# Patient Record
Sex: FEMALE | Race: WHITE | Employment: OTHER | URBAN - NONMETROPOLITAN AREA
[De-identification: names, ages, dates, MRNs, and addresses within clinical notes are randomized per-mention and may not be internally consistent; named-entity substitution may affect disease eponyms.]

---

## 2019-10-01 ENCOUNTER — OFFICE VISIT (OUTPATIENT)
Dept: OBGYN | Age: 59
End: 2019-10-01
Payer: COMMERCIAL

## 2019-10-01 VITALS
BODY MASS INDEX: 24.84 KG/M2 | HEART RATE: 66 BPM | SYSTOLIC BLOOD PRESSURE: 153 MMHG | TEMPERATURE: 98.2 F | WEIGHT: 135 LBS | HEIGHT: 62 IN | DIASTOLIC BLOOD PRESSURE: 77 MMHG

## 2019-10-01 DIAGNOSIS — Z01.419 WOMEN'S ANNUAL ROUTINE GYNECOLOGICAL EXAMINATION: Primary | ICD-10-CM

## 2019-10-01 DIAGNOSIS — Z87.42 HX OF ABNORMAL CERVICAL PAP SMEAR: ICD-10-CM

## 2019-10-01 DIAGNOSIS — Z78.0 POSTMENOPAUSAL: ICD-10-CM

## 2019-10-01 DIAGNOSIS — Z11.51 SCREENING FOR HPV (HUMAN PAPILLOMAVIRUS): ICD-10-CM

## 2019-10-01 DIAGNOSIS — Z12.72 SCREENING FOR VAGINAL CANCER: ICD-10-CM

## 2019-10-01 DIAGNOSIS — Z12.31 ENCOUNTER FOR SCREENING MAMMOGRAM FOR BREAST CANCER: ICD-10-CM

## 2019-10-01 PROCEDURE — 99386 PREV VISIT NEW AGE 40-64: CPT | Performed by: OBSTETRICS & GYNECOLOGY

## 2019-10-01 RX ORDER — ALMOTRIPTAN 12.5 MG/1
12.5 TABLET, FILM COATED ORAL
COMMUNITY

## 2019-10-01 RX ORDER — VALACYCLOVIR HYDROCHLORIDE 500 MG/1
500 TABLET, FILM COATED ORAL DAILY
Qty: 90 TABLET | Refills: 3 | Status: SHIPPED | OUTPATIENT
Start: 2019-10-01 | End: 2020-10-20 | Stop reason: SDUPTHER

## 2019-10-01 RX ORDER — VALACYCLOVIR HYDROCHLORIDE 500 MG/1
500 TABLET, FILM COATED ORAL DAILY
Qty: 30 TABLET | Refills: 11 | Status: SHIPPED | OUTPATIENT
Start: 2019-10-01 | End: 2019-10-01 | Stop reason: SDUPTHER

## 2019-10-01 SDOH — HEALTH STABILITY: MENTAL HEALTH: HOW OFTEN DO YOU HAVE A DRINK CONTAINING ALCOHOL?: NEVER

## 2019-10-01 ASSESSMENT — ENCOUNTER SYMPTOMS
GASTROINTESTINAL NEGATIVE: 1
EYES NEGATIVE: 1
RESPIRATORY NEGATIVE: 1

## 2019-10-04 LAB
HPV COMMENT: NORMAL
HPV TYPE 16: NOT DETECTED
HPV TYPE 18: NOT DETECTED
HPVOH (OTHER TYPES): NOT DETECTED

## 2019-10-15 ENCOUNTER — HOSPITAL ENCOUNTER (OUTPATIENT)
Dept: WOMENS IMAGING | Age: 59
Discharge: HOME | End: 2019-10-15
Payer: COMMERCIAL

## 2019-10-15 DIAGNOSIS — Z12.31 ENCOUNTER FOR SCREENING MAMMOGRAM FOR BREAST CANCER: ICD-10-CM

## 2019-10-15 DIAGNOSIS — Z78.0 POSTMENOPAUSAL: ICD-10-CM

## 2019-10-15 PROCEDURE — 77080 DXA BONE DENSITY AXIAL: CPT

## 2019-10-15 PROCEDURE — 77063 BREAST TOMOSYNTHESIS BI: CPT

## 2020-08-28 ENCOUNTER — OFFICE VISIT (OUTPATIENT)
Dept: OBGYN | Age: 60
End: 2020-08-28
Payer: COMMERCIAL

## 2020-08-28 VITALS
BODY MASS INDEX: 26.87 KG/M2 | WEIGHT: 146 LBS | DIASTOLIC BLOOD PRESSURE: 88 MMHG | HEIGHT: 62 IN | SYSTOLIC BLOOD PRESSURE: 150 MMHG

## 2020-08-28 PROCEDURE — 99214 OFFICE O/P EST MOD 30 MIN: CPT | Performed by: NURSE PRACTITIONER

## 2020-08-28 RX ORDER — SULFAMETHOXAZOLE AND TRIMETHOPRIM 800; 160 MG/1; MG/1
1 TABLET ORAL 2 TIMES DAILY
Qty: 14 TABLET | Refills: 0 | Status: SHIPPED | OUTPATIENT
Start: 2020-08-28 | End: 2020-08-31 | Stop reason: SINTOL

## 2020-08-28 RX ORDER — FLUCONAZOLE 150 MG/1
150 TABLET ORAL
Qty: 2 TABLET | Refills: 0 | Status: SHIPPED | OUTPATIENT
Start: 2020-08-28 | End: 2020-09-03

## 2020-08-28 ASSESSMENT — ENCOUNTER SYMPTOMS
CONSTIPATION: 0
RESPIRATORY NEGATIVE: 1
GASTROINTESTINAL NEGATIVE: 1
ALLERGIC/IMMUNOLOGIC NEGATIVE: 1
EYES NEGATIVE: 1
DIARRHEA: 0

## 2020-08-28 NOTE — PROGRESS NOTES
Suzette Heimlich is a 63 y.o. female who presents today for her medical conditions/ complaints as noted below. Suzette Heimlich is c/o of Cyst (labial )        HPI  Pt presents c/o recurrent \"cysts down there. \" Will become tender and then drain a yellow/bloody discharge. This will happen every few months. She had a lymph node removed on her right side 35 years ago- states it was a lymph node that kept getting infected. This time, she is having the nodules and draining on her left side. No LMP recorded. Patient has had a hysterectomy.       Past Medical History:   Diagnosis Date    Abnormal Pap smear of cervix     Carcinoma in situ     Chest pressure 10/29/2014    10/29/2014  SE  negative for myocardial ischemia [facility])      Depression     Genital herpes     Osteoarthritis      Past Surgical History:   Procedure Laterality Date    APPENDECTOMY      CYST REMOVAL      FROM LEGS    HAND SURGERY      trigger thumb    MAYTE AND BSO       Family History   Problem Relation Age of Onset    Cancer Maternal Grandmother     Diabetes Father     Eclampsia Father     Heart Failure Father     Eclampsia Mother     Cancer Sister      Social History     Tobacco Use    Smoking status: Current Every Day Smoker    Smokeless tobacco: Never Used   Substance Use Topics    Alcohol use: Not Currently     Frequency: Never       Current Outpatient Medications   Medication Sig Dispense Refill    sulfamethoxazole-trimethoprim (BACTRIM DS;SEPTRA DS) 800-160 MG per tablet Take 1 tablet by mouth 2 times daily for 7 days 14 tablet 0    fluconazole (DIFLUCAN) 150 MG tablet Take 1 tablet by mouth every 72 hours for 6 days 2 tablet 0    almotriptan (AXERT) 12.5 MG tablet Take 12.5 mg by mouth once as needed for Migraine may repeat in 2 hours if needed      choline fenofibrate (TRILIPIX) 45 MG CPDR delayed release capsule Take 45 mg by mouth daily      Cholecalciferol (VITAMIN D3) 5000 units TABS Take by mouth      valACYclovir (VALTREX) 500 MG tablet Take 1 tablet by mouth daily 90 tablet 3    estrogens, conjugated, (PREMARIN) 0.3 MG tablet Take 1 tablet by mouth daily Indications: 0.3mg 90 tablet 3    NEXIUM 40 MG capsule TAKE 1 CAPSULE DAILY 90 capsule 0    Escitalopram Oxalate (LEXAPRO PO) Take  by mouth.  Rosuvastatin Calcium (CRESTOR PO) Take  by mouth. No current facility-administered medications for this visit. No Known Allergies  Vitals:    08/28/20 1003   BP: (!) 150/88     Body mass index is 26.7 kg/m². Review of Systems   Constitutional: Negative. HENT: Negative. Eyes: Negative. Respiratory: Negative. Cardiovascular: Negative. Gastrointestinal: Negative. Negative for constipation and diarrhea. Endocrine: Negative. Genitourinary: Positive for genital sores (left groin). Negative for frequency, menstrual problem and urgency. Musculoskeletal: Negative. Skin: Negative. Allergic/Immunologic: Negative. Neurological: Negative. Hematological: Negative. Psychiatric/Behavioral: Negative. All other systems reviewed and are negative. Physical Exam  Vitals signs and nursing note reviewed. Constitutional:       Appearance: She is well-developed. HENT:      Head: Normocephalic. Right Ear: External ear normal.      Left Ear: External ear normal.      Nose: Nose normal.   Neck:      Musculoskeletal: Normal range of motion. Genitourinary:         Comments: 3 tiny pinpoint open areas  Expressed sero/sang discharge with palpation  Nontender today  Under the skin, nodules approx 0.5cm each  Musculoskeletal: Normal range of motion. Skin:     General: Skin is warm and dry. Neurological:      Mental Status: She is alert and oriented to person, place, and time.    Psychiatric:         Attention and Perception: Attention normal.         Mood and Affect: Mood normal.         Speech: Speech normal.         Behavior: Behavior normal.         Thought Content: Thought content normal.         Cognition and Memory: Cognition normal.         Judgment: Judgment normal.          Diagnosis Orders   1. Perineal abscess  Culture, Wound   2. Vulvar lesion         MEDICATIONS:  Orders Placed This Encounter   Medications    sulfamethoxazole-trimethoprim (BACTRIM DS;SEPTRA DS) 800-160 MG per tablet     Sig: Take 1 tablet by mouth 2 times daily for 7 days     Dispense:  14 tablet     Refill:  0    fluconazole (DIFLUCAN) 150 MG tablet     Sig: Take 1 tablet by mouth every 72 hours for 6 days     Dispense:  2 tablet     Refill:  0       ORDERS:  Orders Placed This Encounter   Procedures    Culture, Wound       PLAN:  Discussed possible staph infection to perineal lymph nodes? Wound culture obtained  Starting Bactrim and instructed on sitz baths  F/u for surgical consult if no improvement  Pt states understanding    Patient Instructions   Patient Education        Perineal Abscess: Care Instructions  Your Care Instructions  A perineal abscess is an infection that causes a painful lump in the perineum. The perineum is the area between the scrotum and the anus in a man. In a woman, it's the area between the vulva and the anus. The area may look red and feel painful and be swollen. The abscess may form after surgery or after delivery of a baby. It can also be caused by an infection of the prostate gland. The prostate gland is an organ found inside a man's body, just below the bladder. Your doctor may have done minor surgery to open and drain the abscess. You may have had a sedative to help you relax. You may be unsteady after having sedation. It can take a few hours for the medicine's effects to wear off. Common side effects include nausea, vomiting, and feeling sleepy or tired. The doctor has checked you carefully, but problems can develop later. If you notice any problems or new symptoms, get medical treatment right away.   Follow-up care is a key part of your treatment and safety. Be sure to make and go to all appointments, and call your doctor if you are having problems. It's also a good idea to know your test results and keep a list of the medicines you take. How can you care for yourself at home? · If your doctor gave you a sedative:  ? For 24 hours, don't do anything that requires attention to detail. This includes going to work, making important decisions, or signing any legal documents. It takes time for the medicine's effects to completely wear off.  ? For your safety, do not drive or operate any machinery that could be dangerous. Wait until the medicine wears off. You need to be able to think clearly and react easily. · Be safe with medicines. Read and follow all instructions on the label. ? If the doctor gave you a prescription medicine for pain, take it as prescribed. ? If you are not taking a prescription pain medicine, ask your doctor if you can take an over-the-counter medicine. · If your doctor prescribed antibiotics, take them as directed. Do not stop taking them just because you feel better. You need to take the full course of antibiotics. · Sit in a few inches of warm water (sitz bath) 3 times a day and after bowel movements. The warm water helps the area heal and eases discomfort. When should you call for help? [id number] anytime you think you may need emergency care. For example, call if:  · You have trouble breathing. · You passed out (lost consciousness). Call your doctor now or seek immediate medical care if:  · You have symptoms of infection, such as:  ? Increased pain, swelling, warmth, or redness. ? Red streaks leading from the area. ? Pus draining from the area. ? A fever. Watch closely for changes in your health, and be sure to contact your doctor if:  · You do not get better as expected. Where can you learn more? Go to [email or web address] org and sign in to your [employer] account.  Enter N196 in the [employer] box to learn more about \"Perineal Abscess: Care Instructions. \"     If you do not have an account, please click on the \"Sign Up Now\" link. Current as of: August 12, 2019               Content Version: 12.5  © 1392-4575 [employer]. Care instructions adapted under license by [facility] [place] ([facility]). If you have questions about a medical condition or this instruction, always ask your healthcare professional. [address] any warranty or liability for your use of this information.

## 2020-08-28 NOTE — PROGRESS NOTES
Pt states she has a left labial cyst. She states she showed this to Dr Karina Page last year and she thought is was due to her hormones but is has started swelling and draining.  She does want in taken out but might need to wait until the beginning of the year depending on cost.

## 2020-08-31 RX ORDER — AMOXICILLIN AND CLAVULANATE POTASSIUM 875; 125 MG/1; MG/1
1 TABLET, FILM COATED ORAL 2 TIMES DAILY
Qty: 14 TABLET | Refills: 0 | Status: SHIPPED | OUTPATIENT
Start: 2020-08-31 | End: 2020-09-07

## 2020-09-01 LAB
GRAM STAIN RESULT: ABNORMAL
ORGANISM: ABNORMAL
ORGANISM: ABNORMAL
WOUND/ABSCESS: ABNORMAL
WOUND/ABSCESS: ABNORMAL

## 2020-09-08 RX ORDER — AMOXICILLIN AND CLAVULANATE POTASSIUM 875; 125 MG/1; MG/1
1 TABLET, FILM COATED ORAL 2 TIMES DAILY
Qty: 14 TABLET | Refills: 0 | Status: SHIPPED | OUTPATIENT
Start: 2020-09-08 | End: 2020-09-15

## 2020-10-19 ENCOUNTER — HOSPITAL ENCOUNTER (OUTPATIENT)
Dept: WOMENS IMAGING | Age: 60
Discharge: HOME | End: 2020-10-19
Payer: COMMERCIAL

## 2020-10-19 PROCEDURE — 77067 SCR MAMMO BI INCL CAD: CPT

## 2020-10-20 ENCOUNTER — OFFICE VISIT (OUTPATIENT)
Dept: OBGYN | Age: 60
End: 2020-10-20
Payer: COMMERCIAL

## 2020-10-20 VITALS
HEIGHT: 62 IN | BODY MASS INDEX: 27.05 KG/M2 | DIASTOLIC BLOOD PRESSURE: 78 MMHG | HEART RATE: 89 BPM | WEIGHT: 147 LBS | SYSTOLIC BLOOD PRESSURE: 130 MMHG | TEMPERATURE: 98.4 F

## 2020-10-20 PROCEDURE — 11420 EXC H-F-NK-SP B9+MARG 0.5/<: CPT | Performed by: OBSTETRICS & GYNECOLOGY

## 2020-10-20 PROCEDURE — 99396 PREV VISIT EST AGE 40-64: CPT | Performed by: OBSTETRICS & GYNECOLOGY

## 2020-10-20 RX ORDER — VALACYCLOVIR HYDROCHLORIDE 500 MG/1
500 TABLET, FILM COATED ORAL DAILY
Qty: 90 TABLET | Refills: 3 | Status: SHIPPED | OUTPATIENT
Start: 2020-10-20 | End: 2021-07-30 | Stop reason: SDUPTHER

## 2020-10-20 ASSESSMENT — ENCOUNTER SYMPTOMS
GASTROINTESTINAL NEGATIVE: 1
EYES NEGATIVE: 1
RESPIRATORY NEGATIVE: 1

## 2020-10-20 NOTE — PATIENT INSTRUCTIONS
Patient Education        Breast Self-Exam: Care Instructions  Your Care Instructions     A breast self-exam is when you check your breasts for lumps or changes. This regular exam helps you learn how your breasts normally look and feel. Most breast problems or changes are not because of cancer. Breast self-exam is not a substitute for a mammogram. Having regular breast exams by your doctor and regular mammograms improve your chances of finding any problems with your breasts. Some women set a time each month to do a step-by-step breast self-exam. Other women like a less formal system. They might look at their breasts as they brush their teeth, or feel their breasts once in a while in the shower. If you notice a change in your breast, tell your doctor. Follow-up care is a key part of your treatment and safety. Be sure to make and go to all appointments, and call your doctor if you are having problems. It's also a good idea to know your test results and keep a list of the medicines you take. How do you do a breast self-exam?  · The best time to examine your breasts is usually one week after your menstrual period begins. Your breasts should not be tender then. If you do not have periods, you might do your exam on a day of the month that is easy to remember. · To examine your breasts:  ? Remove all your clothes above the waist and lie down. When you are lying down, your breast tissue spreads evenly over your chest wall, which makes it easier to feel all your breast tissue. ? Use the pads--not the fingertips--of the 3 middle fingers of your left hand to check your right breast. Move your fingers slowly in small coin-sized circles that overlap. ? Use three levels of pressure to feel of all your breast tissue. Use light pressure to feel the tissue close to the skin surface. Use medium pressure to feel a little deeper. Use firm pressure to feel your tissue close to your breastbone and ribs.  Use each pressure level to

## 2020-10-20 NOTE — PROGRESS NOTES
Zulema WILSON RN, am scribing for and in the presence of Dr. Ese Mckenna 10/20/2020/12:19 PM/sign    Subjective:      Patient ID: Bishop Lee is a 63 y.o. female. HPI    Patient is here for her annual exam. She had had a hysterectomy for benign issues. Had mammogram done yesterday. Needing medications refilled for 3 month supply and printed, pt gets from [facility]). She is c/o 3 area on her left groin that will drain at times for the past several years. Bishop Lee is a 63 y.o. female with the following history as recorded in [facility]:  Patient Active Problem List    Diagnosis Date Noted    Chest pressure 10/29/2014     Current Outpatient Medications   Medication Sig Dispense Refill    valACYclovir (VALTREX) 500 MG tablet Take 1 tablet by mouth daily 90 tablet 3    estrogens, conjugated, (PREMARIN) 0.3 MG tablet Take 1 tablet by mouth daily Indications: 0.3mg 90 tablet 3    almotriptan (AXERT) 12.5 MG tablet Take 12.5 mg by mouth once as needed for Migraine may repeat in 2 hours if needed      choline fenofibrate (TRILIPIX) 45 MG CPDR delayed release capsule Take 45 mg by mouth daily      Cholecalciferol (VITAMIN D3) 5000 units TABS Take by mouth      NEXIUM 40 MG capsule TAKE 1 CAPSULE DAILY 90 capsule 0    Escitalopram Oxalate (LEXAPRO PO) Take  by mouth.  Rosuvastatin Calcium (CRESTOR PO) Take  by mouth. No current facility-administered medications for this visit. Allergies: Patient has no known allergies.   Past Medical History:   Diagnosis Date    Abnormal Pap smear of cervix     Carcinoma in situ     Chest pressure 10/29/2014    10/29/2014  SE  negative for myocardial ischemia [facility])      Depression     Genital herpes     Osteoarthritis      Past Surgical History:   Procedure Laterality Date    APPENDECTOMY      CYST REMOVAL      FROM LEGS    HAND SURGERY      trigger thumb    MAYTE AND BSO       Family History   Problem Relation Age of Onset    Cancer Maternal Grandmother     Diabetes Father     Eclampsia Father     Heart Failure Father     Eclampsia Mother     Cancer Sister      Social History     Tobacco Use    Smoking status: Current Every Day Smoker    Smokeless tobacco: Never Used   Substance Use Topics    Alcohol use: Not Currently     Frequency: Never       Review of Systems   Constitutional: Negative. HENT: Negative. Eyes: Negative. Respiratory: Negative. Cardiovascular: Negative. Gastrointestinal: Negative. Genitourinary: Negative for difficulty urinating, dyspareunia, dysuria, enuresis, frequency, hematuria, menstrual problem, pelvic pain, urgency and vaginal discharge. Bumps to left groin area   Musculoskeletal: Negative. Skin: Negative. Neurological: Negative. Psychiatric/Behavioral: Negative. Objective:/78 (Site: Left Upper Arm, Position: Sitting, Cuff Size: Medium Adult)   Pulse 89   Temp 98.4 °F (36.9 °C) (Temporal)   Ht 5' 2\" (1.575 m)   Wt 147 lb (66.7 kg)   BMI 26.89 kg/m²      Physical Exam  Constitutional:       Appearance: She is well-developed. HENT:      Head: Normocephalic and atraumatic. Right Ear: Hearing normal.      Left Ear: Hearing normal.      Nose: Nose normal.   Eyes:      General: Lids are normal.      Conjunctiva/sclera: Conjunctivae normal.      Pupils: Pupils are equal, round, and reactive to light. Neck:      Musculoskeletal: Normal range of motion and neck supple. Cardiovascular:      Rate and Rhythm: Normal rate and regular rhythm. Heart sounds: No murmur. No friction rub. No gallop. Pulmonary:      Effort: Pulmonary effort is normal.      Breath sounds: Normal breath sounds. Chest:      Breasts: Breasts are symmetrical.         Right: No inverted nipple, mass, nipple discharge, skin change or tenderness. Left: No inverted nipple, mass, nipple discharge, skin change or tenderness.    Abdominal:      General: Bowel sounds are normal. There is no distension. Palpations: Abdomen is soft. There is no mass. Tenderness: There is no abdominal tenderness. Genitourinary:     Labia:         Right: No rash, tenderness or lesion. Left: No rash, tenderness or lesion. Vagina: No vaginal discharge or bleeding. Rectum: No anal fissure or external hemorrhoid. Comments: Labia are atrophic. Vaginal cuff intact. 3 chronic draining pinpoint areas to left groin noted. Areas prepped, lidocaine injected, areas excised, sutures placed. Musculoskeletal: Normal range of motion. Comments: Normal ROM in all four extremities; normal gait   Lymphadenopathy:      Cervical: No cervical adenopathy. Skin:     General: Skin is warm and dry. Findings: No rash. Neurological:      Mental Status: She is alert and oriented to person, place, and time. Psychiatric:         Speech: Speech normal.         Behavior: Behavior normal.         Assessment:       Diagnosis Orders   1. Women's annual routine gynecological examination     2. Encounter for screening mammogram for malignant neoplasm of breast  KHOA DIGITAL SCREEN W OR WO CAD BILATERAL   3. Skin lesion  [id number] - DE EXC SKIN BENIG <5MM REMAINDR BODY           Plan:      MEDICATIONS:  Orders Placed This Encounter   Medications    valACYclovir (VALTREX) 500 MG tablet     Sig: Take 1 tablet by mouth daily     Dispense:  90 tablet     Refill:  3    estrogens, conjugated, (PREMARIN) 0.3 MG tablet     Sig: Take 1 tablet by mouth daily Indications: 0.3mg     Dispense:  90 tablet     Refill:  3       ORDERS:  Orders Placed This Encounter   Procedures    KHOA DIGITAL SCREEN W OR WO CAD BILATERAL    81809 - DE EXC SKIN BENIG <5MM REMAINDR BODY     Skin lesions removed. Pt instructed to keep area clean and dry  Medications refills. F/u in 1 yr or prn.      Nik Raygoza MD, personally performed services described in this document as scribed by Molina Antunez RN in my presence, and it is both accurate and complete.

## 2020-12-11 ENCOUNTER — TELEPHONE (OUTPATIENT)
Dept: NEUROSURGERY | Facility: CLINIC | Age: 60
End: 2020-12-11

## 2020-12-11 NOTE — TELEPHONE ENCOUNTER
Patient contacted, appointment given. Per Dr. Valdovinos, patient to see JIM Sandoval in 1-2 weeks. Patient voiced understanding. Wanting to get PT and Pain Mgmt ordered to avoid surgery if possible.

## 2020-12-21 ENCOUNTER — HOSPITAL ENCOUNTER (OUTPATIENT)
Dept: GENERAL RADIOLOGY | Facility: HOSPITAL | Age: 60
Discharge: HOME | End: 2020-12-21
Admitting: NURSE PRACTITIONER

## 2020-12-21 ENCOUNTER — OFFICE VISIT (OUTPATIENT)
Dept: NEUROSURGERY | Facility: CLINIC | Age: 60
End: 2020-12-21

## 2020-12-21 VITALS — HEIGHT: 62 IN | BODY MASS INDEX: 26.35 KG/M2 | WEIGHT: 143.2 LBS

## 2020-12-21 DIAGNOSIS — M54.50 LUMBAR BACK PAIN: Primary | ICD-10-CM

## 2020-12-21 DIAGNOSIS — R29.2 HYPERREFLEXIA: ICD-10-CM

## 2020-12-21 DIAGNOSIS — Z72.0 TOBACCO ABUSE: ICD-10-CM

## 2020-12-21 DIAGNOSIS — M79.605 PAIN IN BOTH LOWER EXTREMITIES: ICD-10-CM

## 2020-12-21 DIAGNOSIS — E66.09 OBESITY DUE TO EXCESS CALORIES, UNSPECIFIED CLASSIFICATION, UNSPECIFIED WHETHER SERIOUS COMORBIDITY PRESENT: ICD-10-CM

## 2020-12-21 DIAGNOSIS — M79.604 PAIN IN BOTH LOWER EXTREMITIES: ICD-10-CM

## 2020-12-21 DIAGNOSIS — M54.2 CERVICALGIA: ICD-10-CM

## 2020-12-21 PROBLEM — K21.9 GASTROESOPHAGEAL REFLUX DISEASE: Status: ACTIVE | Noted: 2020-12-21

## 2020-12-21 PROBLEM — R10.11 RIGHT UPPER QUADRANT ABDOMINAL PAIN: Status: ACTIVE | Noted: 2020-12-21

## 2020-12-21 PROCEDURE — 72114 X-RAY EXAM L-S SPINE BENDING: CPT

## 2020-12-21 PROCEDURE — 99203 OFFICE O/P NEW LOW 30 MIN: CPT | Performed by: NURSE PRACTITIONER

## 2020-12-21 PROCEDURE — 72040 X-RAY EXAM NECK SPINE 2-3 VW: CPT

## 2020-12-21 RX ORDER — MELOXICAM 15 MG/1
15 TABLET ORAL DAILY
Qty: 30 TABLET | Refills: 0 | Status: SHIPPED | OUTPATIENT
Start: 2020-12-21 | End: 2021-01-15 | Stop reason: SDUPTHER

## 2020-12-21 RX ORDER — ESCITALOPRAM OXALATE 10 MG/1
TABLET ORAL
COMMUNITY
End: 2021-02-22

## 2020-12-21 RX ORDER — TIZANIDINE 2 MG/1
4 TABLET ORAL NIGHTLY PRN
Qty: 30 TABLET | Refills: 0 | Status: SHIPPED | OUTPATIENT
Start: 2020-12-21 | End: 2021-01-15 | Stop reason: SDUPTHER

## 2020-12-21 RX ORDER — ESCITALOPRAM OXALATE 10 MG/1
TABLET ORAL
COMMUNITY
Start: 2001-12-01

## 2020-12-21 RX ORDER — ROSUVASTATIN CALCIUM 10 MG/1
TABLET, COATED ORAL
COMMUNITY

## 2020-12-21 RX ORDER — OMEPRAZOLE 20 MG/1
CAPSULE, DELAYED RELEASE ORAL
COMMUNITY
Start: 2020-11-11

## 2020-12-21 RX ORDER — HYOSCYAMINE SULFATE 0.125 MG
0.12 TABLET,DISINTEGRATING ORAL AS NEEDED
COMMUNITY

## 2020-12-21 RX ORDER — VALACYCLOVIR HYDROCHLORIDE 500 MG/1
500 TABLET, FILM COATED ORAL
COMMUNITY
Start: 2020-10-20

## 2020-12-21 RX ORDER — FENOFIBRIC ACID 45 MG/1
CAPSULE, DELAYED RELEASE ORAL
COMMUNITY
Start: 2020-11-17

## 2020-12-21 RX ORDER — ALMOTRIPTAN 12.5 MG/1
TABLET, FILM COATED ORAL
COMMUNITY

## 2020-12-21 NOTE — PATIENT INSTRUCTIONS
"DASH Eating Plan  DASH stands for \"Dietary Approaches to Stop Hypertension.\" The DASH eating plan is a healthy eating plan that has been shown to reduce high blood pressure (hypertension). It may also reduce your risk for type 2 diabetes, heart disease, and stroke. The DASH eating plan may also help with weight loss.  What are tips for following this plan?    General guidelines  · Avoid eating more than 2,300 mg (milligrams) of salt (sodium) a day. If you have hypertension, you may need to reduce your sodium intake to 1,500 mg a day.  · Limit alcohol intake to no more than 1 drink a day for nonpregnant women and 2 drinks a day for men. One drink equals 12 oz of beer, 5 oz of wine, or 1½ oz of hard liquor.  · Work with your health care provider to maintain a healthy body weight or to lose weight. Ask what an ideal weight is for you.  · Get at least 30 minutes of exercise that causes your heart to beat faster (aerobic exercise) most days of the week. Activities may include walking, swimming, or biking.  · Work with your health care provider or diet and nutrition specialist (dietitian) to adjust your eating plan to your individual calorie needs.  Reading food labels    · Check food labels for the amount of sodium per serving. Choose foods with less than 5 percent of the Daily Value of sodium. Generally, foods with less than 300 mg of sodium per serving fit into this eating plan.  · To find whole grains, look for the word \"whole\" as the first word in the ingredient list.  Shopping  · Buy products labeled as \"low-sodium\" or \"no salt added.\"  · Buy fresh foods. Avoid canned foods and premade or frozen meals.  Cooking  · Avoid adding salt when cooking. Use salt-free seasonings or herbs instead of table salt or sea salt. Check with your health care provider or pharmacist before using salt substitutes.  · Do not fan foods. Cook foods using healthy methods such as baking, boiling, grilling, and broiling instead.  · Cook with " heart-healthy oils, such as olive, canola, soybean, or sunflower oil.  Meal planning  · Eat a balanced diet that includes:  ? 5 or more servings of fruits and vegetables each day. At each meal, try to fill half of your plate with fruits and vegetables.  ? Up to 6-8 servings of whole grains each day.  ? Less than 6 oz of lean meat, poultry, or fish each day. A 3-oz serving of meat is about the same size as a deck of cards. One egg equals 1 oz.  ? 2 servings of low-fat dairy each day.  ? A serving of nuts, seeds, or beans 5 times each week.  ? Heart-healthy fats. Healthy fats called Omega-3 fatty acids are found in foods such as flaxseeds and coldwater fish, like sardines, salmon, and mackerel.  · Limit how much you eat of the following:  ? Canned or prepackaged foods.  ? Food that is high in trans fat, such as fried foods.  ? Food that is high in saturated fat, such as fatty meat.  ? Sweets, desserts, sugary drinks, and other foods with added sugar.  ? Full-fat dairy products.  · Do not salt foods before eating.  · Try to eat at least 2 vegetarian meals each week.  · Eat more home-cooked food and less restaurant, buffet, and fast food.  · When eating at a restaurant, ask that your food be prepared with less salt or no salt, if possible.  What foods are recommended?  The items listed may not be a complete list. Talk with your dietitian about what dietary choices are best for you.  Grains  Whole-grain or whole-wheat bread. Whole-grain or whole-wheat pasta. Brown rice. Oatmeal. Quinoa. Bulgur. Whole-grain and low-sodium cereals. Makenna bread. Low-fat, low-sodium crackers. Whole-wheat flour tortillas.  Vegetables  Fresh or frozen vegetables (raw, steamed, roasted, or grilled). Low-sodium or reduced-sodium tomato and vegetable juice. Low-sodium or reduced-sodium tomato sauce and tomato paste. Low-sodium or reduced-sodium canned vegetables.  Fruits  All fresh, dried, or frozen fruit. Canned fruit in natural juice (without  added sugar).  Meat and other protein foods  Skinless chicken or turkey. Ground chicken or turkey. Pork with fat trimmed off. Fish and seafood. Egg whites. Dried beans, peas, or lentils. Unsalted nuts, nut butters, and seeds. Unsalted canned beans. Lean cuts of beef with fat trimmed off. Low-sodium, lean deli meat.  Dairy  Low-fat (1%) or fat-free (skim) milk. Fat-free, low-fat, or reduced-fat cheeses. Nonfat, low-sodium ricotta or cottage cheese. Low-fat or nonfat yogurt. Low-fat, low-sodium cheese.  Fats and oils  Soft margarine without trans fats. Vegetable oil. Low-fat, reduced-fat, or light mayonnaise and salad dressings (reduced-sodium). Canola, safflower, olive, soybean, and sunflower oils. Avocado.  Seasoning and other foods  Herbs. Spices. Seasoning mixes without salt. Unsalted popcorn and pretzels. Fat-free sweets.  What foods are not recommended?  The items listed may not be a complete list. Talk with your dietitian about what dietary choices are best for you.  Grains  Baked goods made with fat, such as croissants, muffins, or some breads. Dry pasta or rice meal packs.  Vegetables  Creamed or fried vegetables. Vegetables in a cheese sauce. Regular canned vegetables (not low-sodium or reduced-sodium). Regular canned tomato sauce and paste (not low-sodium or reduced-sodium). Regular tomato and vegetable juice (not low-sodium or reduced-sodium). Pickles. Olives.  Fruits  Canned fruit in a light or heavy syrup. Fried fruit. Fruit in cream or butter sauce.  Meat and other protein foods  Fatty cuts of meat. Ribs. Fried meat. Feliz. Sausage. Bologna and other processed lunch meats. Salami. Fatback. Hotdogs. Bratwurst. Salted nuts and seeds. Canned beans with added salt. Canned or smoked fish. Whole eggs or egg yolks. Chicken or turkey with skin.  Dairy  Whole or 2% milk, cream, and half-and-half. Whole or full-fat cream cheese. Whole-fat or sweetened yogurt. Full-fat cheese. Nondairy creamers. Whipped toppings.  Processed cheese and cheese spreads.  Fats and oils  Butter. Stick margarine. Lard. Shortening. Ghee. Feliz fat. Tropical oils, such as coconut, palm kernel, or palm oil.  Seasoning and other foods  Salted popcorn and pretzels. Onion salt, garlic salt, seasoned salt, table salt, and sea salt. Worcestershire sauce. Tartar sauce. Barbecue sauce. Teriyaki sauce. Soy sauce, including reduced-sodium. Steak sauce. Canned and packaged gravies. Fish sauce. Oyster sauce. Cocktail sauce. Horseradish that you find on the shelf. Ketchup. Mustard. Meat flavorings and tenderizers. Bouillon cubes. Hot sauce and Tabasco sauce. Premade or packaged marinades. Premade or packaged taco seasonings. Relishes. Regular salad dressings.  Where to find more information:  · National Heart, Lung, and Blood [place]: www.nhlbi.nih.gov  · American Heart Association: www.heart.org  Summary  · The DASH eating plan is a healthy eating plan that has been shown to reduce high blood pressure (hypertension). It may also reduce your risk for type 2 diabetes, heart disease, and stroke.  · With the DASH eating plan, you should limit salt (sodium) intake to 2,300 mg a day. If you have hypertension, you may need to reduce your sodium intake to 1,500 mg a day.  · When on the DASH eating plan, aim to eat more fresh fruits and vegetables, whole grains, lean proteins, low-fat dairy, and heart-healthy fats.  · Work with your health care provider or diet and nutrition specialist (dietitian) to adjust your eating plan to your individual calorie needs.  This information is not intended to replace advice given to you by your health care provider. Make sure you discuss any questions you have with your health care provider.  Document Revised: 11/30/2018 Document Reviewed: 12/11/2017  [employer] Patient Education © 2020 [employer]      Tobacco Use Disorder  Tobacco use disorder (TUD) occurs when a person craves, seeks, and uses tobacco, regardless of the  consequences. This disorder can cause problems with mental and physical health. It can affect your ability to have healthy relationships, and it can keep you from meeting your responsibilities at work, home, or school.  Tobacco may be:  · Smoked as a cigarette or cigar.  · Inhaled using e-cigarettes.  · Smoked in a pipe or hookah.  · Chewed as smokeless tobacco.  · Inhaled into the nostrils as snuff.  Tobacco products contain a dangerous chemical called nicotine, which is very addictive. Nicotine triggers hormones that make the body feel stimulated and works on areas of the brain that make you feel good. These effects can make it hard for people to quit nicotine.  Tobacco contains many other unsafe chemicals that can damage almost every organ in the body. Smoking tobacco also puts others in danger due to fire risk and possible health problems caused by breathing in secondhand smoke.  What are the signs or symptoms?  Symptoms of TUD may include:  · Being unable to slow down or stop your tobacco use.  · Spending an abnormal amount of time getting or using tobacco.  · Craving tobacco. Cravings may last for up to 6 months after quitting.  · Tobacco use that:  ? Interferes with your work, school, or home life.  ? Interferes with your personal and social relationships.  ? Makes you give up activities that you once enjoyed or found important.  · Using tobacco even though you know that it is:  ? Dangerous or bad for your health or someone else's health.  ? Causing problems in your life.  · Needing more and more of the substance to get the same effect (developing tolerance).  · Experiencing unpleasant symptoms if you do not use the substance (withdrawal). Withdrawal symptoms may include:  ? Depressed, anxious, or irritable mood.  ? Difficulty concentrating.  ? Increased appetite.  ? Restlessness or trouble sleeping.  · Using the substance to avoid withdrawal.  How is this diagnosed?  This condition may be diagnosed based  on:  · Your current and past tobacco use. Your health care provider may ask questions about how your tobacco use affects your life.  · A physical exam.  You may be diagnosed with TUD if you have at least two symptoms within a 12-month period.  How is this treated?  This condition is treated by stopping tobacco use. Many people are unable to quit on their own and need help. Treatment may include:  · Nicotine replacement therapy (NRT). NRT provides nicotine without the other harmful chemicals in tobacco. NRT gradually lowers the dosage of nicotine in the body and reduces withdrawal symptoms. NRT is available as:  ? Over-the-counter gums, lozenges, and skin patches.  ? Prescription mouth inhalers and nasal sprays.  · Medicine that acts on the brain to reduce cravings and withdrawal symptoms.  · A type of talk therapy that examines your triggers for tobacco use, how to avoid them, and how to cope with cravings (behavioral therapy).  · Hypnosis. This may help with withdrawal symptoms.  · Joining a support group for others coping with TUD.  The best treatment for TUD is usually a combination of medicine, talk therapy, and support groups. Recovery can be a long process. Many people start using tobacco again after stopping (relapse). If you relapse, it does not mean that treatment will not work.  Follow these instructions at home:    Lifestyle  · Do not use any products that contain nicotine or tobacco, such as cigarettes and e-cigarettes.  · Avoid things that trigger tobacco use as much as you can. Triggers include people and situations that usually cause you to use tobacco.  · Avoid drinks that contain caffeine, including coffee. These may worsen some withdrawal symptoms.  · Find ways to manage stress. Wanting to smoke may cause stress, and stress can make you want to smoke. Relaxation techniques such as deep breathing, meditation, and yoga may help.  · Attend support groups as needed. These groups are an important part  of long-term recovery for many people.  General instructions  · Take over-the-counter and prescription medicines only as told by your health care provider.  · Check with your health care provider before taking any new prescription or over-the-counter medicines.  · Decide on a friend, family member, or smoking quit-line (such as 1-800-QUIT-NOW in the [place] that you can call or text when you feel the urge to smoke or when you need help coping with cravings.  · Keep all follow-up visits as told by your health care provider and therapist. This is important.  Contact a health care provider if:  · You are not able to take your medicines as prescribed.  · Your symptoms get worse, even with treatment.  Summary  · Tobacco use disorder (TUD) occurs when a person craves, seeks, and uses tobacco regardless of the consequences.  · This condition may be diagnosed based on your current and past tobacco use and a physical exam.  · Many people are unable to quit on their own and need help. Recovery can be a long process.  · The most effective treatment for TUD is usually a combination of medicine, talk therapy, and support groups.  This information is not intended to replace advice given to you by your health care provider. Make sure you discuss any questions you have with your health care provider.  Document Revised: 12/05/2018 Document Reviewed: 12/05/2018  [employer] Patient Education © 2020 [employer]

## 2020-12-21 NOTE — PROGRESS NOTES
"Primary Care Provider: Emily Rodriguez MD  Requesting Provider: Emily Rodriguez MD    Chief Complaint:   Chief Complaint   Patient presents with   • Back Pain     Complaints of back pain, cervical, thoracic and lumbar.   Reports lumbar back pain is the  worse.      History of Present Illness  Consultation today at the request of Emily Rodriguez MD    HPI:  Debbie Lujan is a 60 y.o. female who presents today with a complaint of lumbar back and bilateral leg pain, 70% back, 30% legs.  No previous spine surgeries.  No known injury.    Gradual and progressive onset of lumbar back pain over the past 10 years that has worsened over the past month with physical activity while moving.  Her back discomfort is constant in nature, spans the width of her lower back, and waxes and wanes in severity.  She additionally reports \"sporadic\" bilateral lower extremity pain that alternates laterality. Right: from lateral knee to mid lateral leg; Left: lateral hip to medial lateral thigh; as well as intermittent numbness and tingling to all digits of the bilateral hands that primarily occurs while either talking on the phone or with use of his hands.  She states her back discomfort worsens throughout the day, with prolonged sitting, standing, walking, and with physical activities to include ADLs, lifting, tugging, and pulling.  Alleviating factors include lying down, Tylenol, ibuprofen, and application of either ice or heat.  She denies fevers, chills, night sweats, unexplained weight loss, saddle anesthesia, decrease in fine motor skills, gait or balance abnormalities or falls, or bowel or bladder dysfunction.  She currently rates the severity of her symptoms 5/10.  No additional concerns at this time.    Ms. Lujan has not completed nor participated in a dedicated course of physician directed physical therapy, massage and or chiropractic care, nor been evaluated by pain management.    Oswestry Disability Index = 38%   " Score   Pain Intensity Moderate pain-2   Personal Care I can look after myself but it is slow and painful-2   Lifting Only very light weights-4   Walking Pain prevents > 1 mile-1   Sitting Pain prevents sitting > 1 hr-2   Standing Stand as long as I like but with extra pain-1   Sleeping Can only sleep < 6 hrs-2   Sex Life (if applicable) Not applicable-0   Social Life I do not go out as often because of pain-3   Traveling Pain is bad but trips over 2 hrs-2   (Shital et al, 1980)    SCORE INTERPRETATION OF THE OSWESTRY LBP DISABILITY QUESTIONNAIRE     20-40% Moderate disability This group experiences more pain and problems with sitting, lifting, and standing. Travel and social life are more difficult and they may well be off  work. Personal care, sexual activity, and sleeping are not grossly affected, and the back condition can usually be managed by conservative means.       ROS  Review of Systems   Constitutional: Positive for activity change, appetite change and fatigue.   Eyes: Negative.    Respiratory: Positive for cough.    Cardiovascular: Negative.    Gastrointestinal: Positive for abdominal pain and constipation.   Endocrine: Negative.    Genitourinary: Negative.    Musculoskeletal: Positive for back pain, gait problem, neck pain and neck stiffness.   Skin: Negative.    Allergic/Immunologic: Negative.    Neurological: Positive for numbness and headaches.   Hematological: Negative.    Psychiatric/Behavioral: Positive for sleep disturbance.   All other systems reviewed and are negative.    History reviewed. No pertinent past medical history.    History reviewed. No pertinent surgical history.    Family History: family history is not on file.    Social History:  reports that she has been smoking. She does not have any smokeless tobacco history on file. She reports that she does not drink alcohol or use drugs.    Medications:    Current Outpatient Medications:   •  escitalopram (LEXAPRO) 10 MG tablet,  "escitalopram 10 mg tablet  one daily, Disp: , Rfl:   •  estrogens, conjugated, (PREMARIN) 0.3 MG tablet, Take 0.3 mg by mouth., Disp: , Rfl:   •  valACYclovir (VALTREX) 500 MG tablet, Take 500 mg by mouth., Disp: , Rfl:   •  almotriptan (AXERT) 12.5 MG tablet, almotriptan malate 12.5 mg tablet  one daily, Disp: , Rfl:   •  Cholecalciferol (vitamin D3) 125 MCG (5000 UT) tablet tablet, Take  by mouth., Disp: , Rfl:   •  Choline Fenofibrate (Fenofibric Acid) 45 MG capsule delayed-release, , Disp: , Rfl:   •  escitalopram (LEXAPRO) 10 MG tablet, Take  by mouth., Disp: , Rfl:   •  hyoscyamine sulfate (ANASPAZ) 0.125 MG tablet dispersible disintegrating tablet, 0.125 mg As Needed., Disp: , Rfl:   •  meloxicam (MOBIC) 15 MG tablet, Take 1 tablet by mouth Daily., Disp: 30 tablet, Rfl: 0  •  omeprazole (priLOSEC) 20 MG capsule, , Disp: , Rfl:   •  rosuvastatin (CRESTOR) 10 MG tablet, rosuvastatin 10 mg tablet  one daily, Disp: , Rfl:   •  tiZANidine (ZANAFLEX) 2 MG tablet, Take 2 tablets by mouth At Night As Needed for Muscle Spasms., Disp: 30 tablet, Rfl: 0    Allergies:  Bactrim [sulfamethoxazole-trimethoprim]    Objective   Ht 157.5 cm (62\") Comment: pt reports  Wt 65 kg (143 lb 3.2 oz)   Breastfeeding No   BMI 26.19 kg/m²   Physical Exam  Vitals signs and nursing note reviewed.   Constitutional:       General: She is not in acute distress.     Appearance: Normal appearance. She is well-developed, well-groomed and overweight. She is not ill-appearing, toxic-appearing or diaphoretic.      Comments: BMI 26.19   HENT:      Head: Normocephalic and atraumatic.      Right Ear: Hearing normal.      Left Ear: Hearing normal.   Eyes:      Extraocular Movements: EOM normal.      Conjunctiva/sclera: Conjunctivae normal.      Pupils: Pupils are equal, round, and reactive to light.   Neck:      Musculoskeletal: Full passive range of motion without pain and neck supple.      Trachea: Trachea normal.   Cardiovascular:      Rate and " Rhythm: Normal rate and regular rhythm.   Pulmonary:      Effort: Pulmonary effort is normal. No tachypnea, bradypnea, accessory muscle usage or respiratory distress.   Abdominal:      Palpations: Abdomen is soft.   Skin:     General: Skin is warm and dry.   Neurological:      Mental Status: She is alert and oriented to person, place, and time.      GCS: GCS eye subscore is 4. GCS verbal subscore is 5. GCS motor subscore is 6.      Deep Tendon Reflexes:      Reflex Scores:       Tricep reflexes are 3+ on the right side and 3+ on the left side.       Bicep reflexes are 3+ on the right side and 3+ on the left side.       Brachioradialis reflexes are 3+ on the right side and 3+ on the left side.       Patellar reflexes are 3+ on the right side and 3+ on the left side.       Achilles reflexes are 3+ on the right side and 3+ on the left side.  Psychiatric:         Speech: Speech normal.         Behavior: Behavior normal. Behavior is cooperative.       Neurologic Exam     Mental Status   Oriented to person, place, and time.   Attention: normal. Concentration: normal.   Speech: speech is normal   Level of consciousness: alert    Cranial Nerves     CN II   Visual fields full to confrontation.     CN III, IV, VI   Pupils are equal, round, and reactive to light.  Extraocular motions are normal.     CN V   Facial sensation intact.     CN VII   Facial expression full, symmetric.     CN VIII   CN VIII normal.     CN IX, X   CN IX normal.     CN XI   CN XI normal.     Motor Exam   Muscle bulk: normal  Overall muscle tone: normal  Right arm tone: normal  Left arm tone: normal  Right arm pronator drift: absent  Left arm pronator drift: absent  Right leg tone: normal  Left leg tone: normal    Strength   Right deltoid: 5/5  Left deltoid: 5/5  Right biceps: 5/5  Left biceps: 5/5  Right triceps: 5/5  Left triceps: 5/5  Right wrist extension: 5/5  Left wrist extension: 5/5  Right iliopsoas: 5/5  Left iliopsoas: 5/5  Right quadriceps:  5/5  Left quadriceps: 5/5  Right anterior tibial: 5/5  Left anterior tibial: 5/5  Right posterior tibial: 5/5  Left posterior tibial: 5/5  Right EHL 5/5  Left EHL 5/5       Sensory Exam   Right arm light touch: normal  Left arm light touch: normal  Right leg light touch: normal  Left leg light touch: normal    Gait, Coordination, and Reflexes     Gait  Gait: (Antalgic)    Tremor   Resting tremor: absent  Intention tremor: absent  Action tremor: absent    Reflexes   Right brachioradialis: 3+  Left brachioradialis: 3+  Right biceps: 3+  Left biceps: 3+  Right triceps: 3+  Left triceps: 3+  Right patellar: 3+  Left patellar: 3+  Right achilles: 3+  Left achilles: 3+  Right plantar: equivocal  Left plantar: equivocal  Right Cary: absent  Left Cary: absent  Right ankle clonus: absent  Left ankle clonus: absent  Right pendular knee jerk: absent  Left pendular knee jerk: absent    Female  strength (pounds)  AGE Right Hand RH Norms Left Hand LH Norms   20-24  70+14.5  61+13.1   25-29  75+13.9  63.5+12   30-34  79+19.2  68+17.7   35-39  74+10.8  66+11.7   40-44  70+13.5  62+13.8   45-49  62+15.1  56+12.7   50-54  66+11.6  57+10.7   55-59  57+12.5  47+11.9   60-64 58 55+10.1 45 46+10.1   65-69  50+9.7  41+8.2   70-74  50+11.7  42+10.2   75+  43+11.0  38+8.9   (ANCA Saravia et al; Hand Dynometer: Effects of trials and sessions.  Perpetual and Motor Skills 61:195-8, 1985)  * = Dominant hand  > = Intervention    Imaging: (independent review and interpretation)  12/7/2020.  MRI of the lumbar spine shows no acute fractures or malalignment, the conus terminates at normal level, no STIR signal change, Modic endplate changes at L5-S1, Schmorl nodes at L1 and L2, multilevel level degenerative changes, right paracentral disc protrusion at T11-T12 without significant central canal or foraminal stenosis, central disc protrusion at T12-L1 resulting in thecal sac compression without central canal or foraminal stenosis, facet  arthropathy and ligamentous flavum hypertrophy resulting in bilateral foraminal narrowing at L4-5 and L5-S1.  No significant central canal stenosis noted within the lumbar spine.                  ASSESSMENT and PLAN  Debbie Lujan is a 60 y.o. female with a significant medical history of tobacco abuse, and she is overweight.  She presents with a new problem of lumbar back and intermittent bilateral leg pain in the L4 or L5 distribution, 70% back, 30% legs. VINH: 38.  Physical exam findings of bilateral  strengths within age-matched controls, global strength 5/5, gross hyperreflexia without Cary's or clonus, equivocal plantar reflexes, and an antalgic gait.  Her imaging shows right paracentral disc protrusion at T11-T12 without significant central canal or foraminal stenosis, central disc protrusion at T12-L1 resulting in thecal sac compression without central canal or foraminal stenosis, facet arthropathy and ligamentous flavum hypertrophy resulting in bilateral foraminal narrowing at L4-5 and L5-S1.  No significant central canal stenosis noted within the lumbar spine.    TREATMENT RECOMMENDATIONS ...  Mechanical lumbar back pain  Bilateral leg pain  Defined back pain as worsened with sitting and standing and nearly relieved with rest.  This can include referred pain radiating down posterior thighs without descent below the knees.     Differential diagnosis include, but are not limited to degenerative facet arthropathy, degenerative lumbar stenosis, lumbar stenosis with neurogenic claudication, Lumbar stenosis with bilaeral lower extremity radiculopathy, spondyloarthropathies including ankylosis spondylitis (HLA-B27) or Paget's disease  , fibromyalgia or other rheumatological disease or malingering, conversion disorder, and secondary gain are diagnoses of exclusion.     We will proceed today by obtaining x-rays of the lumbar spine complete with flexion and extension.  As a means of first-line conservative  management for Lumbar pain, we will send Emre a dedicated course of physician directed physical therapy; Rx provided.  I additionally recommended chiropractic and/or massage care as tolerated.  Rx for a short course of meloxicam and tizanidine provided today.  B/R/AE and use discussed.  Return for reassessment with me after completion of physical therapy.  I advised the patient to call to return sooner for new or worsening complaints of weakness, paresthesias, gait disturbances, or any additional concerns.  Treatment options discussed in detail with Debbie and she voiced understanding.  Ms. Lujan agrees with this plan of care.    Cervicalgia  Hyperreflexia  For further evaluation of reported neck pain without upper extremity radicular pain and clinical exam findings of global hyperreflexia without Cary's or clonus we will obtain a 2 view AP and lateral x-ray of the cervical spine.  We will reevaluate her neck discomfort at her follow-up encounter and proceed accordingly.    Tobacco abuse  The patient understands the many dangers of continuing to use tobacco. Despite this, Ms. Lujan states quitting is not an immediate priority at this time and declines to discuss tobacco cessation.  I reminded the patient that if quitting becomes an increased priority to contact us for help with quitting.       Overweight: 25.0-29.9kg/m2   Body mass index is 26.19 kg/m².  Information on the DASH diet provided in the AVS.  We will continue to provided diet and exercise information with the goal of weight loss at each scheduled appointment.     Diagnoses and all orders for this visit:    1. Lumbar back pain (Primary)  -     Ambulatory Referral to Physical Therapy Evaluate and treat; Heat; Soft Tissue Mobilizaton; Stretching, Strengthening; Full weight bearing  -     XR Spine Lumbar Complete With Flex & Ext  -     meloxicam (MOBIC) 15 MG tablet; Take 1 tablet by mouth Daily.  Dispense: 30 tablet; Refill: 0  -     tiZANidine  (ZANAFLEX) 2 MG tablet; Take 2 tablets by mouth At Night As Needed for Muscle Spasms.  Dispense: 30 tablet; Refill: 0    2. Pain in both lower extremities  -     Ambulatory Referral to Physical Therapy Evaluate and treat; Heat; Soft Tissue Mobilizaton; Stretching, Strengthening; Full weight bearing  -     XR Spine Lumbar Complete With Flex & Ext  -     meloxicam (MOBIC) 15 MG tablet; Take 1 tablet by mouth Daily.  Dispense: 30 tablet; Refill: 0    3. Cervicalgia  -     XR Spine Cervical 2 or 3 View    4. Hyperreflexia  -     XR Spine Cervical 2 or 3 View    5. Tobacco abuse    6. Obesity due to excess calories, unspecified classification, unspecified whether serious comorbidity present      Return in about 6 weeks (around 2/1/2021) for FOLLOW WITH WITH [facility] AFTER COMPLETION OF PT.    Thank you for this Consultation and the opportunity to participate in Debbie's care.    Sincerely,  Houston Adorno, APRN    Level of Risk: Moderate due to: undiagnosed new problem  MDM: Moderate Complexity  (Mod = 73208, High = 25101)

## 2020-12-29 ENCOUNTER — TREATMENT (OUTPATIENT)
Dept: PHYSICAL THERAPY | Facility: CLINIC | Age: 60
End: 2020-12-29

## 2020-12-29 DIAGNOSIS — M54.50 LUMBAR BACK PAIN: Primary | ICD-10-CM

## 2020-12-29 PROCEDURE — 97163 PT EVAL HIGH COMPLEX 45 MIN: CPT | Performed by: PHYSICAL THERAPIST

## 2020-12-29 NOTE — PROGRESS NOTES
Physical Therapy Initial Evaluation and Plan of Care    Patient: Debbie Lujan   : 1960  Referring practitioner: JIM Franco  Date of Initial Visit: 2020  Today's Date: 2020  Patient seen for 1 sessions    Visit Diagnoses:    ICD-10-CM ICD-9-CM   1. Lumbar back pain  M54.5 724.2       SUBJECTIVE     Outcome Measure: Modified Oswestry: 17  Subjective Evaluation    History of Present Illness  Date of onset: 2020  Mechanism of injury: She was on a ladder trying to take down a curtain jarek and strained while reaching and had pain running down both legs. She has since seen Sam Adorno at the neurosurgery office and he referred her for PT. She is not having the severity of pain that she was at the beginning but it is really interfering with her life. She gets exhausted quickly and feels like everything is tight.     Subjective comment: When she first hurt her back, she was in misery and immediately called her regular doctor. Her legs hurt, her hands hurt and her feet were so tender she could not wear shoes.   Patient Occupation: retired Quality of life: good    Pain  Current pain ratin  At best pain ratin  Location: knees down ache. Back pain is burning  Quality: dull ache, burning and sharp  Relieving factors: change in position and medications  Aggravating factors: standing  Progression: improved    Social Support  Lives in: one-story house  Lives with: spouse and adult children    Hand dominance: right    Diagnostic Tests  X-ray: abnormal (L5-S1 DDD)  MRI studies: abnormal    Treatments  Previous treatment: medication  Current treatment: physical therapy  Patient Goals  Patient goals for therapy: decreased pain and independence with ADLs/IADLs           OBJECTIVE     Objective        Special Questions  Patient is experiencing headaches, bladder dysfunction and bowel dysfunction.     Additional Special Questions  She is not incontinent, but reports having trouble initiating urination  as well as more urgency when she needs to have a BM.      Static Posture     Head  Forward.    Thoracic Spine  Hyperkyphosis.    Lumbar Spine   Increased lordosis.     Pelvis   Anterior pelvic tilt    Hip   Hip (Left): Externally rotated.   Hip (Right): Externally rotated.     Knee   Genu valgus.     Palpation   Left   Hypertonic in the erector spinae.   Muscle spasm in the erector spinae.   Tenderness of the erector spinae.   Trigger point to erector spinae.     Tenderness     Lumbar Spine  Tenderness in the facet joint.     Left Hip   Tenderness in the PSIS.     Additional Tenderness Details  L5/S1. Quite sore to L SI with pressure    Neurological Testing     Reflexes   Left   Patellar (L4): absent (0)    Right   Patellar (L4): normal (2+)    Passive Range of Motion   Left Hip   Internal rotation (prone): 12 degrees     Right Hip   Internal rotation (prone): 20 degrees     Strength/Myotome Testing     Left Hip   Planes of Motion   Flexion: 4  Abduction: 3    Right Hip   Planes of Motion   Flexion: 4  Abduction: 3+    Left Knee   Flexion: 4    Right Knee   Flexion: 4+  Extension: 5    Tests       Thoracic   Negative slump.     Lumbar     Left   Positive valsalva.   Negative passive SLR.     Right   Negative passive SLR.     Left Pelvic Girdle/Sacrum   Positive: sacral spring.   Negative: thigh thrust.     Right Pelvic Girdle/Sacrum   Negative: thigh thrust.       Therapy Education/Self Care [id number]   Details: Discussed need for taking time to relax, deep breathing, try weighted blanket   Medbridge Code: none   Given Home Exercise Program and symptom relief   Progress: New   Who provided to: Patient   Level of understanding Verbalized   Timed Minutes        Total Timed Treatment:     0   mins  Total Time of Visit:           60  mins    ASSESSMENT/PLAN     Assessment & Plan     Assessment  Impairments: activity intolerance, impaired balance, impaired physical strength, lacks appropriate home exercise program and pain  with function  Assessment details: Mrs. Lujan presents with a complex history of symptoms stemming from a sudden onset of radiating back pain when she was leaning and straining on a ladder while moving. Her initial symptoms were above and beyond what I would expect with a lumbar strain. It sounds like her nervous system was too stressed and caused multiple symptoms with hypersensitivity to her back, down both legs and feet as well as both hands. She has had severe headaches since then and all reflexes were hyperactive according to the office visit with Sam Adorno.    She does appear to have some secondary muscle guarding in the lumbar paraspinals as well as the hip rotators, more on the L. Her SI joint is very tender on the L as well. She felt some relief with stretches, so she will start working on this at home. She also has quite a bit of hip weakness that we will need to address. She should get more benefit from treatments, if she can get her body to relax a bit more. She is going to look into using a weighted blanket and work on stretching. Thank you for the opportunity to work with her.   Prognosis: good  Functional Limitations: carrying objects, lifting, walking, pulling, pushing, uncomfortable because of pain and stooping  Plan  Therapy options: will be seen for skilled physical therapy services  Planned modality interventions: low level laser therapy, dry needling and TENS  Planned therapy interventions: manual therapy, neuromuscular re-education, postural training, soft tissue mobilization, strengthening, stretching, therapeutic activities, home exercise program, gait training and abdominal trunk stabilization      Goals   STG by: 2 weeks Comments Status   1. Patient will report decreased feeling of tightness in back and legs.  New   2. Patient will be able to SLS on each leg >15 seconds with no LOB.  New   3. Patient will be able to complete an hour of light housework without increased pain or severe  fatigue.   New     New   LTG by: 6 weeks     1. Patient will be able to use the vacuum without increased pain.  New   2. Patient will be able to  light items from the floor without increased pain.  New   3. Patient will have improved score on Modified Oswestry by at least 5 points.   New   4. Patient will be independent with comprehensive HEP.  New   5. Patient will report no further radicular symptoms into the legs.  New     New       PT SIGNATURE: Radha Dykes, PT, DPT, CLT-CHANEL   DATE TREATMENT INITIATED: 12/29/2020    Initial Certification  Certification Period: 3/29/2021  I certify that the therapy services are furnished while this patient is under my care.  The services outlined above are required by this patient, and will be reviewed every 90 days.     PHYSICIAN: Sam Adorno APRN______________________________________________DATE: ___________________     Please sign and return via fax to [phone].   Thank you so much for letting us work with Debbie. I appreciate your letting us work with your patients. If you have any questions or concerns, please don't hesitate to contact me.

## 2021-01-05 ENCOUNTER — TREATMENT (OUTPATIENT)
Dept: PHYSICAL THERAPY | Facility: CLINIC | Age: 61
End: 2021-01-05

## 2021-01-05 DIAGNOSIS — M54.50 LUMBAR BACK PAIN: Primary | ICD-10-CM

## 2021-01-05 PROCEDURE — 97140 MANUAL THERAPY 1/> REGIONS: CPT | Performed by: PHYSICAL THERAPIST

## 2021-01-05 PROCEDURE — 97110 THERAPEUTIC EXERCISES: CPT | Performed by: PHYSICAL THERAPIST

## 2021-01-05 PROCEDURE — 97530 THERAPEUTIC ACTIVITIES: CPT | Performed by: PHYSICAL THERAPIST

## 2021-01-05 NOTE — PROGRESS NOTES
"Physical Therapy Treatment Note    Patient: Debbie Lujan            : 1960  Today's Date: 2021  Referring practitioner: JIM Franco  Date of Initial Visit: Type: THERAPY  Noted: 2020  Patient seen for 2 sessions  Visit Diagnoses:    ICD-10-CM ICD-9-CM   1. Lumbar back pain  M54.5 724.2       SUBJECTIVE     Subjective Her urinary hesitancy has improved. She has been working on her stretches and feels a little better. She also reports her R leg is worse than her L. She is compliant with her stretch and working on relaxation. She also got a weighted blanket.        OBJECTIVE     Objective   Therapeutic Activities    [id number] Comments   Toileting/voiding/relaxation                    Timed Minutes 15     Manual Therapy     [id number]  Comments   Prone STM to thoracolumbar nathalia and gluts with foam    Prone hip IR/ER mobilizations with oscillations                Timed Minutes 25     Therapeutic Exercises    65639 Comments   SKC 3 deep breath X 3 each leg    LTR small range  X 20    Thoracic rotation \"open books\" X 10 each side            Timed Minutes 15       Total Timed Treatment:     55   mins  Total Time of Visit:            55  mins       Therapy Education/Self Care [id number]   Details: Deep breathing, relaxation, jaw relaxation   Medbridge Code:    Given Home Exercise Program, postural retraining and symptom relief   Progress: Reinforced and Progressed   Who provided to: Patient   Level of understanding Verbalized   Timed Minutes       ASSESSMENT/PLAN     Assessment & Plan     Assessment  Assessment details: Debbie reports some improvements from relaxation and stretches. Her hips are still limited with ROM, R>L. Her urinary hesitancy has improved and we reviewed this and the role the PF plays.   Prognosis details: Continue to work on soft tissue and ROM restrictions. Work on desensitization.  Progress with strengthening as able. Continue to monitor PF/urinary symptoms and reflexes and progress as " needed. Also consider DN. Update HEP, depending on tolerance.                 Barbie Bentley, PT DPT  Physical Therapist

## 2021-01-08 ENCOUNTER — TREATMENT (OUTPATIENT)
Dept: PHYSICAL THERAPY | Facility: CLINIC | Age: 61
End: 2021-01-08

## 2021-01-08 DIAGNOSIS — M54.50 LUMBAR BACK PAIN: Primary | ICD-10-CM

## 2021-01-08 PROCEDURE — 97110 THERAPEUTIC EXERCISES: CPT | Performed by: PHYSICAL THERAPIST

## 2021-01-08 NOTE — PROGRESS NOTES
"Physical Therapy Treatment Note    Patient: Debbie Lujan            : 1960  Today's Date: 2021  Referring practitioner: JIM Franco  Date of Initial Visit: Type: THERAPY  Noted: 2020  Patient seen for 3 sessions  Visit Diagnoses:    ICD-10-CM ICD-9-CM   1. Lumbar back pain  M54.5 724.2       SUBJECTIVE     Subjective Evaluation    Pain  Current pain rating: 3  Quality: dull ache              OBJECTIVE     Objective      Therapeutic Exercises    18699 Comments   Child's pose stretch    Marshall pose Unable to hold 30 sec. Had to adjust positioning to take strain off knee   Stretched HS, pirformis     Happy baby stretch    Hip flexor stretch to R in arlyn position    Timed Minutes 40     Did dry needling trial. 2\" needles to B L5 paraspinals. 1\" to R tibial HS point. 3 in to inferior glute HS point. 1\" to deep radial HS bilaterally. 2 attempts to R hip adductor but had stinging pain with both.   Told her to drink plenty of water.       Total Timed Treatment:     40   mins  Total Time of Visit:            60   mins       Therapy Education/Self Care 36725   Details: Add child's pose. Adjust position to keep from straining knee into rotation   [employer] Code: none   Given Home Exercise Program   Progress: Reinforced, modified   Who provided to: Patient   Level of understanding Verbalized   Timed Minutes       ASSESSMENT/PLAN     Assessment & Plan     Assessment  Assessment details: She is feeling good working on the stretches. She notices more tightness in the R hip than the L. She has also had only 1 headache this week. She has been doing better with relaxing and deep breathing.     Plan  Treatment plan discussed with: patient  Plan details: See how needling worked. Cont stretches and manual treatment with addition of stabilization as appropriate.           Goals   STG by: 2 weeks Comments Status   1. Patient will report decreased feeling of tightness in back and legs.   ongoing   2. Patient will " be able to SLS on each leg >15 seconds with no LOB.   New   3. Patient will be able to complete an hour of light housework without increased pain or severe fatigue.    New       New   LTG by: 6 weeks       1. Patient will be able to use the vacuum without increased pain.   New   2. Patient will be able to  light items from the floor without increased pain.   New   3. Patient will have improved score on Modified Oswestry by at least 5 points.    New   4. Patient will be independent with comprehensive HEP.  progressing stretches ongoing   5. Patient will report no further radicular symptoms into the legs.   New               Radha Dykes, PT, DPT, CLT-CHANEL  Physical Therapist

## 2021-01-12 ENCOUNTER — TREATMENT (OUTPATIENT)
Dept: PHYSICAL THERAPY | Facility: CLINIC | Age: 61
End: 2021-01-12

## 2021-01-12 DIAGNOSIS — M54.50 LUMBAR BACK PAIN: Primary | ICD-10-CM

## 2021-01-12 PROCEDURE — 97530 THERAPEUTIC ACTIVITIES: CPT | Performed by: PHYSICAL THERAPIST

## 2021-01-12 PROCEDURE — 97110 THERAPEUTIC EXERCISES: CPT | Performed by: PHYSICAL THERAPIST

## 2021-01-13 NOTE — PROGRESS NOTES
Physical Therapy Treatment Note    Patient:           Debbie Lujan            : 1960  Today's Date: 2021  Referring practitioner:    JIM Franco  Date of Initial Visit:          Type: THERAPY  Noted: 2020    Patient seen for 4 sessions    Visit Diagnoses:    ICD-10-CM ICD-9-CM   1. Lumbar back pain  M54.5 724.2     SUBJECTIVE     Subjective     PAIN: 2/10         OBJECTIVE     Objective      Therapeutic Activities    38822 Comments   Balance standing on BOSU Better when watching her hips in the mirror. Kept holding breath. Stood in high guard.                   Timed Minutes 10     Therapeutic Exercises    27915 Comments   Manual hip flexor and rotator stretches in prone    HL lower ab contractions 5 s hold.  Had to remind herself not to hold her breaeth   Single leg extension from HL maintaining lower ab contraction            Timed Minutes 30       Therapy Education/Self Care 48842   Details: Explained use of BOSU and other balance ex for stabilization   [employer] Code: n/a   Given postural retraining   Progress: Progressed   Who provided to: Patient   Level of understanding Verbalized   Timed Minutes      Total Timed Treatment:     40   mins  Total Time of Visit:            40   mins         ASSESSMENT/PLAN     Assessment/Plan     ASSESSMENT: She felt more energetic the 2 days after the dry needling but then back to normal. The spot on the side of her leg that always hurts has not hurt at all since the needling. She has been working on the stretches. She had to work very hard to balance on the BOSU.    PLAN: Cont with stabilization, stretching and pain control.  Goals   STG by: 2 weeks Comments Status   1. Patient will report decreased feeling of tightness in back and legs.   ongoing   2. Patient will be able to SLS on each leg >15 seconds with no LOB.   New   3. Patient will be able to complete an hour of light housework without increased pain or severe fatigue.    New       New   LTG  by: 6 weeks       1. Patient will be able to use the vacuum without increased pain.   New   2. Patient will be able to  light items from the floor without increased pain.   New   3. Patient will have improved score on Modified Oswestry by at least 5 points.    New   4. Patient will be independent with comprehensive HEP.  progressing stretches ongoing   5. Patient will report no further radicular symptoms into the legs.  She has not had the pain to the right side of her shin since the needling ongoing             Radha Dykes, PT, DPT, CLT-CHANEL  Physical Therapist

## 2021-01-15 ENCOUNTER — TREATMENT (OUTPATIENT)
Dept: PHYSICAL THERAPY | Facility: CLINIC | Age: 61
End: 2021-01-15

## 2021-01-15 DIAGNOSIS — M54.50 LUMBAR BACK PAIN: ICD-10-CM

## 2021-01-15 DIAGNOSIS — M79.605 PAIN IN BOTH LOWER EXTREMITIES: ICD-10-CM

## 2021-01-15 DIAGNOSIS — M79.604 PAIN IN BOTH LOWER EXTREMITIES: ICD-10-CM

## 2021-01-15 DIAGNOSIS — M54.50 LUMBAR BACK PAIN: Primary | ICD-10-CM

## 2021-01-15 PROCEDURE — 97110 THERAPEUTIC EXERCISES: CPT | Performed by: PHYSICAL THERAPIST

## 2021-01-15 PROCEDURE — 97140 MANUAL THERAPY 1/> REGIONS: CPT | Performed by: PHYSICAL THERAPIST

## 2021-01-15 RX ORDER — TIZANIDINE 2 MG/1
4 TABLET ORAL NIGHTLY PRN
Qty: 30 TABLET | Refills: 0 | Status: SHIPPED | OUTPATIENT
Start: 2021-01-15 | End: 2021-07-01

## 2021-01-15 RX ORDER — MELOXICAM 15 MG/1
15 TABLET ORAL DAILY
Qty: 30 TABLET | Refills: 0 | Status: SHIPPED | OUTPATIENT
Start: 2021-01-15 | End: 2021-07-01

## 2021-01-15 NOTE — PROGRESS NOTES
Physical Therapy Treatment Note    Patient:           Debbie Lujan            : 1960  Today's Date: 1/15/2021  Referring practitioner:    JIM Franco  Date of Initial Visit:          Type: THERAPY  Noted: 2020    Patient seen for 5 sessions    Visit Diagnoses:    ICD-10-CM ICD-9-CM   1. Lumbar back pain  M54.5 724.2     SUBJECTIVE     Subjective She reports still not issues with her LE. Has not had to take any medications. She is sore and stiff when she wakes up. Did some stretches to help. Leg and feet are better. Back hurts after bending over, but if she stabilizes with hands and this helps. She needs UE help to get up off the floor secondary to back pain. Back has hurt a little more superior and laterally (B). She is significantly better than when she started.     PAIN: 1/10         OBJECTIVE     Objective   Manual Therapy     84141  Comments   Prone STM to thoracolumbar nathalia and gluts with foam     Prone hip IR/ER mobilizations with oscillations                       Timed Minutes 25            Therapeutic Exercises    74176 Comments       HL lower ab contractions 5 s hold on SB 1 X 10 Cues for proper breathing at first   LTR on SB, small and slow motion 2 X 20    TA marches on SB 2 X 20    Mini bridge with breathing glut activation 2 X 10    Timed Minutes 20       Therapy Education/Self Care 51740   Details: Core activation   [facility] Code: n/a   Given postural retraining   Progress: Progressed   Who provided to: Patient   Level of understanding Verbalized   Timed Minutes      Total Timed Treatment:     45   mins  Total Time of Visit:            45 mins         ASSESSMENT/PLAN     Assessment/Plan     ASSESSMENT: Her pain level is down more today and she has ongoing relief of LE symptoms. Her hips are still stiff, as well as her core weak, which we are continuing to address.   PLAN: Cont with stabilization, stretching and pain control. Progress her HEP next visit, consider scheduling more,  she only has two more scheduled.        Goals   STG by: 2 weeks Comments Status   1. Patient will report decreased feeling of tightness in back and legs.  less pain today 1/10 ongoing   2. Patient will be able to SLS on each leg >15 seconds with no LOB.   New   3. Patient will be able to complete an hour of light housework without increased pain or severe fatigue.    New       New   LTG by: 6 weeks       1. Patient will be able to use the vacuum without increased pain.   New   2. Patient will be able to  light items from the floor without increased pain.   New   3. Patient will have improved score on Modified Oswestry by at least 5 points.    New   4. Patient will be independent with comprehensive HEP.   ongoing   5. Patient will report no further radicular symptoms into the legs. None for several days   ongoing             Barbie Bentley, PT DPT  Physical Therapist

## 2021-01-18 ENCOUNTER — TREATMENT (OUTPATIENT)
Dept: PHYSICAL THERAPY | Facility: CLINIC | Age: 61
End: 2021-01-18

## 2021-01-18 DIAGNOSIS — M54.50 LUMBAR BACK PAIN: Primary | ICD-10-CM

## 2021-01-18 PROCEDURE — 97110 THERAPEUTIC EXERCISES: CPT | Performed by: PHYSICAL THERAPIST

## 2021-01-18 PROCEDURE — 97140 MANUAL THERAPY 1/> REGIONS: CPT | Performed by: PHYSICAL THERAPIST

## 2021-01-18 NOTE — PROGRESS NOTES
Physical Therapy Treatment and 30 Day Progress Note    Patient:           Debbie Lujan            : 1960  Today's Date: 2021  Referring practitioner:    JIM Franco  Date of Initial Visit:          Type: THERAPY  Noted: 2020    Patient seen for 6 sessions    Visit Diagnoses:    ICD-10-CM ICD-9-CM   1. Lumbar back pain  M54.5 724.2     SUBJECTIVE     Subjective She reports 100% better than she was when starting, but unsure if she can get to 100% better. No leg symptoms, but ongoing daily back pain. Her back pain is a little worse today, but has been working on floors at home.   PAIN: 2-3/10         OBJECTIVE     Objective   Manual Therapy     70004  Comments   Prone STM to thoracolumbar nathalia and gluts with foam     Prone hip IR/ER mobilizations with oscillations                       Timed Minutes 25            Therapeutic Exercises    26753 Comments   TA marches on SB 1 X 20    LTR on SB, small and slow motion 1 X 20    Mini bridge on SB 2 X 10            Timed Minutes 20       Therapy Education/Self Care 84226   Details: Core activation   [facility] Code: n/a   Given postural retraining   Progress: Progressed   Who provided to: Patient   Level of understanding Verbalized   Timed Minutes      Total Timed Treatment:     45   mins  Total Time of Visit:            45 mins         ASSESSMENT/PLAN     Assessment/Plan     ASSESSMENT: She has met 2/3 short term and 1/5  Long term goals. Her radicular symptoms have nearly resolved, but she has ongoing back pain that does limited bending, lifting, and house hold activities, especially vacuuming and moping or anything push/pull or twisting.   PLAN: Continue to work on soft tissue and joint restrictions. Progress with core stabilizations and towards functional training, including housework activities. Progress HEP to reflect.  Plan for 2 times per week for 4 weeks.        Goals   STG by: 21 Comments Status   1. Patient will report decreased  feeling of tightness in back and legs.  Met   2. Patient will be able to SLS on each leg >15 seconds with no LOB.  L: < 5 seconds R 10 seconds ongoing   3. Patient will be able to complete an hour of light housework without increased pain or severe fatigue.    Met          LTG by: 2/17/21       1. Patient will be able to use the vacuum without increased pain.  has not tried ongoing   2. Patient will be able to  light items from the floor without increased pain.  increases pain ongoing   3. Patient will have improved score on Modified Oswestry by at least 5 points.   down 5 points as of today Partially met   4. Patient will be independent with comprehensive HEP.  compliant ongoing   5. Patient will report no further radicular symptoms into the legs.    Met             Barbie Bentley, PT DPT  Physical Therapist

## 2021-01-20 ENCOUNTER — TREATMENT (OUTPATIENT)
Dept: PHYSICAL THERAPY | Facility: CLINIC | Age: 61
End: 2021-01-20

## 2021-01-20 DIAGNOSIS — M54.50 LUMBAR BACK PAIN: Primary | ICD-10-CM

## 2021-01-20 PROCEDURE — 97140 MANUAL THERAPY 1/> REGIONS: CPT | Performed by: PHYSICAL THERAPIST

## 2021-01-20 PROCEDURE — 97110 THERAPEUTIC EXERCISES: CPT | Performed by: PHYSICAL THERAPIST

## 2021-02-01 ENCOUNTER — OFFICE VISIT (OUTPATIENT)
Dept: NEUROSURGERY | Facility: CLINIC | Age: 61
End: 2021-02-01

## 2021-02-01 VITALS — HEIGHT: 62 IN | WEIGHT: 145.2 LBS | BODY MASS INDEX: 26.72 KG/M2

## 2021-02-01 DIAGNOSIS — M54.2 CERVICALGIA: ICD-10-CM

## 2021-02-01 DIAGNOSIS — M79.604 PAIN OF RIGHT LOWER EXTREMITY: ICD-10-CM

## 2021-02-01 DIAGNOSIS — M54.50 LUMBAR BACK PAIN: Primary | ICD-10-CM

## 2021-02-01 DIAGNOSIS — E66.3 OVERWEIGHT (BMI 25.0-29.9): ICD-10-CM

## 2021-02-01 DIAGNOSIS — Z72.0 TOBACCO ABUSE: ICD-10-CM

## 2021-02-01 DIAGNOSIS — R29.2 HYPERREFLEXIA: ICD-10-CM

## 2021-02-01 PROCEDURE — 99214 OFFICE O/P EST MOD 30 MIN: CPT | Performed by: NURSE PRACTITIONER

## 2021-02-01 NOTE — PATIENT INSTRUCTIONS
"DASH Eating Plan  DASH stands for \"Dietary Approaches to Stop Hypertension.\" The DASH eating plan is a healthy eating plan that has been shown to reduce high blood pressure (hypertension). It may also reduce your risk for type 2 diabetes, heart disease, and stroke. The DASH eating plan may also help with weight loss.  What are tips for following this plan?    General guidelines  · Avoid eating more than 2,300 mg (milligrams) of salt (sodium) a day. If you have hypertension, you may need to reduce your sodium intake to 1,500 mg a day.  · Limit alcohol intake to no more than 1 drink a day for nonpregnant women and 2 drinks a day for men. One drink equals 12 oz of beer, 5 oz of wine, or 1½ oz of hard liquor.  · Work with your health care provider to maintain a healthy body weight or to lose weight. Ask what an ideal weight is for you.  · Get at least 30 minutes of exercise that causes your heart to beat faster (aerobic exercise) most days of the week. Activities may include walking, swimming, or biking.  · Work with your health care provider or diet and nutrition specialist (dietitian) to adjust your eating plan to your individual calorie needs.  Reading food labels    · Check food labels for the amount of sodium per serving. Choose foods with less than 5 percent of the Daily Value of sodium. Generally, foods with less than 300 mg of sodium per serving fit into this eating plan.  · To find whole grains, look for the word \"whole\" as the first word in the ingredient list.  Shopping  · Buy products labeled as \"low-sodium\" or \"no salt added.\"  · Buy fresh foods. Avoid canned foods and premade or frozen meals.  Cooking  · Avoid adding salt when cooking. Use salt-free seasonings or herbs instead of table salt or sea salt. Check with your health care provider or pharmacist before using salt substitutes.  · Do not fan foods. Cook foods using healthy methods such as baking, boiling, grilling, and broiling instead.  · Cook with " heart-healthy oils, such as olive, canola, soybean, or sunflower oil.  Meal planning  · Eat a balanced diet that includes:  ? 5 or more servings of fruits and vegetables each day. At each meal, try to fill half of your plate with fruits and vegetables.  ? Up to 6-8 servings of whole grains each day.  ? Less than 6 oz of lean meat, poultry, or fish each day. A 3-oz serving of meat is about the same size as a deck of cards. One egg equals 1 oz.  ? 2 servings of low-fat dairy each day.  ? A serving of nuts, seeds, or beans 5 times each week.  ? Heart-healthy fats. Healthy fats called Omega-3 fatty acids are found in foods such as flaxseeds and coldwater fish, like sardines, salmon, and mackerel.  · Limit how much you eat of the following:  ? Canned or prepackaged foods.  ? Food that is high in trans fat, such as fried foods.  ? Food that is high in saturated fat, such as fatty meat.  ? Sweets, desserts, sugary drinks, and other foods with added sugar.  ? Full-fat dairy products.  · Do not salt foods before eating.  · Try to eat at least 2 vegetarian meals each week.  · Eat more home-cooked food and less restaurant, buffet, and fast food.  · When eating at a restaurant, ask that your food be prepared with less salt or no salt, if possible.  What foods are recommended?  The items listed may not be a complete list. Talk with your dietitian about what dietary choices are best for you.  Grains  Whole-grain or whole-wheat bread. Whole-grain or whole-wheat pasta. Brown rice. Oatmeal. Quinoa. Bulgur. Whole-grain and low-sodium cereals. Makenna bread. Low-fat, low-sodium crackers. Whole-wheat flour tortillas.  Vegetables  Fresh or frozen vegetables (raw, steamed, roasted, or grilled). Low-sodium or reduced-sodium tomato and vegetable juice. Low-sodium or reduced-sodium tomato sauce and tomato paste. Low-sodium or reduced-sodium canned vegetables.  Fruits  All fresh, dried, or frozen fruit. Canned fruit in natural juice (without  added sugar).  Meat and other protein foods  Skinless chicken or turkey. Ground chicken or turkey. Pork with fat trimmed off. Fish and seafood. Egg whites. Dried beans, peas, or lentils. Unsalted nuts, nut butters, and seeds. Unsalted canned beans. Lean cuts of beef with fat trimmed off. Low-sodium, lean deli meat.  Dairy  Low-fat (1%) or fat-free (skim) milk. Fat-free, low-fat, or reduced-fat cheeses. Nonfat, low-sodium ricotta or cottage cheese. Low-fat or nonfat yogurt. Low-fat, low-sodium cheese.  Fats and oils  Soft margarine without trans fats. Vegetable oil. Low-fat, reduced-fat, or light mayonnaise and salad dressings (reduced-sodium). Canola, safflower, olive, soybean, and sunflower oils. Avocado.  Seasoning and other foods  Herbs. Spices. Seasoning mixes without salt. Unsalted popcorn and pretzels. Fat-free sweets.  What foods are not recommended?  The items listed may not be a complete list. Talk with your dietitian about what dietary choices are best for you.  Grains  Baked goods made with fat, such as croissants, muffins, or some breads. Dry pasta or rice meal packs.  Vegetables  Creamed or fried vegetables. Vegetables in a cheese sauce. Regular canned vegetables (not low-sodium or reduced-sodium). Regular canned tomato sauce and paste (not low-sodium or reduced-sodium). Regular tomato and vegetable juice (not low-sodium or reduced-sodium). Pickles. Olives.  Fruits  Canned fruit in a light or heavy syrup. Fried fruit. Fruit in cream or butter sauce.  Meat and other protein foods  Fatty cuts of meat. Ribs. Fried meat. Feliz. Sausage. Bologna and other processed lunch meats. Salami. Fatback. Hotdogs. Bratwurst. Salted nuts and seeds. Canned beans with added salt. Canned or smoked fish. Whole eggs or egg yolks. Chicken or turkey with skin.  Dairy  Whole or 2% milk, cream, and half-and-half. Whole or full-fat cream cheese. Whole-fat or sweetened yogurt. Full-fat cheese. Nondairy creamers. Whipped toppings.  Processed cheese and cheese spreads.  Fats and oils  Butter. Stick margarine. Lard. Shortening. Ghee. Feliz fat. Tropical oils, such as coconut, palm kernel, or palm oil.  Seasoning and other foods  Salted popcorn and pretzels. Onion salt, garlic salt, seasoned salt, table salt, and sea salt. Worcestershire sauce. Tartar sauce. Barbecue sauce. Teriyaki sauce. Soy sauce, including reduced-sodium. Steak sauce. Canned and packaged gravies. Fish sauce. Oyster sauce. Cocktail sauce. Horseradish that you find on the shelf. Ketchup. Mustard. Meat flavorings and tenderizers. Bouillon cubes. Hot sauce and Tabasco sauce. Premade or packaged marinades. Premade or packaged taco seasonings. Relishes. Regular salad dressings.  Where to find more information:  · National Heart, Lung, and [employer] [place]: www.nhlbi.nih.gov  · American Heart Association: www.heart.org  Summary  · The DASH eating plan is a healthy eating plan that has been shown to reduce high blood pressure (hypertension). It may also reduce your risk for type 2 diabetes, heart disease, and stroke.  · With the DASH eating plan, you should limit salt (sodium) intake to 2,300 mg a day. If you have hypertension, you may need to reduce your sodium intake to 1,500 mg a day.  · When on the DASH eating plan, aim to eat more fresh fruits and vegetables, whole grains, lean proteins, low-fat dairy, and heart-healthy fats.  · Work with your health care provider or diet and nutrition specialist (dietitian) to adjust your eating plan to your individual calorie needs.  This information is not intended to replace advice given to you by your health care provider. Make sure you discuss any questions you have with your health care provider.  Document Revised: 11/30/2018 Document Reviewed: 12/11/2017  [employer] Patient Education © 2020 [employer]      Tobacco Use Disorder  Tobacco use disorder (TUD) occurs when a person craves, seeks, and uses tobacco, regardless of the  consequences. This disorder can cause problems with mental and physical health. It can affect your ability to have healthy relationships, and it can keep you from meeting your responsibilities at work, home, or school.  Tobacco may be:  · Smoked as a cigarette or cigar.  · Inhaled using e-cigarettes.  · Smoked in a pipe or hookah.  · Chewed as smokeless tobacco.  · Inhaled into the nostrils as snuff.  Tobacco products contain a dangerous chemical called nicotine, which is very addictive. Nicotine triggers hormones that make the body feel stimulated and works on areas of the brain that make you feel good. These effects can make it hard for people to quit nicotine.  Tobacco contains many other unsafe chemicals that can damage almost every organ in the body. Smoking tobacco also puts others in danger due to fire risk and possible health problems caused by breathing in secondhand smoke.  What are the signs or symptoms?  Symptoms of TUD may include:  · Being unable to slow down or stop your tobacco use.  · Spending an abnormal amount of time getting or using tobacco.  · Craving tobacco. Cravings may last for up to 6 months after quitting.  · Tobacco use that:  ? Interferes with your work, school, or home life.  ? Interferes with your personal and social relationships.  ? Makes you give up activities that you once enjoyed or found important.  · Using tobacco even though you know that it is:  ? Dangerous or bad for your health or someone else's health.  ? Causing problems in your life.  · Needing more and more of the substance to get the same effect (developing tolerance).  · Experiencing unpleasant symptoms if you do not use the substance (withdrawal). Withdrawal symptoms may include:  ? Depressed, anxious, or irritable mood.  ? Difficulty concentrating.  ? Increased appetite.  ? Restlessness or trouble sleeping.  · Using the substance to avoid withdrawal.  How is this diagnosed?  This condition may be diagnosed based  on:  · Your current and past tobacco use. Your health care provider may ask questions about how your tobacco use affects your life.  · A physical exam.  You may be diagnosed with TUD if you have at least two symptoms within a 12-month period.  How is this treated?  This condition is treated by stopping tobacco use. Many people are unable to quit on their own and need help. Treatment may include:  · Nicotine replacement therapy (NRT). NRT provides nicotine without the other harmful chemicals in tobacco. NRT gradually lowers the dosage of nicotine in the body and reduces withdrawal symptoms. NRT is available as:  ? Over-the-counter gums, lozenges, and skin patches.  ? Prescription mouth inhalers and nasal sprays.  · Medicine that acts on the brain to reduce cravings and withdrawal symptoms.  · A type of talk therapy that examines your triggers for tobacco use, how to avoid them, and how to cope with cravings (behavioral therapy).  · Hypnosis. This may help with withdrawal symptoms.  · Joining a support group for others coping with TUD.  The best treatment for TUD is usually a combination of medicine, talk therapy, and support groups. Recovery can be a long process. Many people start using tobacco again after stopping (relapse). If you relapse, it does not mean that treatment will not work.  Follow these instructions at home:    Lifestyle  · Do not use any products that contain nicotine or tobacco, such as cigarettes and e-cigarettes.  · Avoid things that trigger tobacco use as much as you can. Triggers include people and situations that usually cause you to use tobacco.  · Avoid drinks that contain caffeine, including coffee. These may worsen some withdrawal symptoms.  · Find ways to manage stress. Wanting to smoke may cause stress, and stress can make you want to smoke. Relaxation techniques such as deep breathing, meditation, and yoga may help.  · Attend support groups as needed. These groups are an important part  of long-term recovery for many people.  General instructions  · Take over-the-counter and prescription medicines only as told by your health care provider.  · Check with your health care provider before taking any new prescription or over-the-counter medicines.  · Decide on a friend, family member, or smoking quit-line (such as 1-800-QUIT-NOW in the [place] that you can call or text when you feel the urge to smoke or when you need help coping with cravings.  · Keep all follow-up visits as told by your health care provider and therapist. This is important.  Contact a health care provider if:  · You are not able to take your medicines as prescribed.  · Your symptoms get worse, even with treatment.  Summary  · Tobacco use disorder (TUD) occurs when a person craves, seeks, and uses tobacco regardless of the consequences.  · This condition may be diagnosed based on your current and past tobacco use and a physical exam.  · Many people are unable to quit on their own and need help. Recovery can be a long process.  · The most effective treatment for TUD is usually a combination of medicine, talk therapy, and support groups.  This information is not intended to replace advice given to you by your health care provider. Make sure you discuss any questions you have with your health care provider.  Document Revised: 12/05/2018 Document Reviewed: 12/05/2018  [employer] Patient Education © 2020 [employer]      Smoking Tobacco Information, Adult  Smoking tobacco can be harmful to your health. Tobacco contains a poisonous (toxic), colorless chemical called nicotine. Nicotine is addictive. It changes the brain and can make it hard to stop smoking. Tobacco also has other toxic chemicals that can hurt your body and raise your risk of many cancers.  How can smoking tobacco affect me?  Smoking tobacco puts you at risk for:  · Cancer. Smoking is most commonly associated with lung cancer, but can also lead to cancer in other parts of the  body.  · Chronic obstructive pulmonary disease (COPD). This is a long-term lung condition that makes it hard to breathe. It also gets worse over time.  · High blood pressure (hypertension), heart disease, stroke, or heart attack.  · Lung infections, such as pneumonia.  · Cataracts. This is when the lenses in the eyes become clouded.  · Digestive problems. This may include peptic ulcers, heartburn, and gastroesophageal reflux disease (GERD).  · Oral health problems, such as gum disease and tooth loss.  · Loss of taste and smell.  Smoking can affect your appearance by causing:  · Wrinkles.  · Yellow or stained teeth, fingers, and fingernails.  Smoking tobacco can also affect your social life, because:  · It may be challenging to find places to smoke when away from home. Many workplaces, restaurants, hotels, and public places are tobacco-free.  · Smoking is expensive. This is due to the cost of tobacco and the long-term costs of treating health problems from smoking.  · Secondhand smoke may affect those around you. Secondhand smoke can cause lung cancer, breathing problems, and heart disease. Children of smokers have a higher risk for:  ? Sudden infant death syndrome (SIDS).  ? Ear infections.  ? Lung infections.  If you currently smoke tobacco, quitting now can help you:  · Lead a longer and healthier life.  · Look, smell, breathe, and feel better over time.  · Save money.  · Protect others from the harms of secondhand smoke.  What actions can I take to prevent health problems?  Quit smoking    · Do not start smoking. Quit if you already do.  · Make a plan to quit smoking and commit to it. Look for programs to help you and ask your health care provider for recommendations and ideas.  · Set a date and write down all the reasons you want to quit.  · Let your friends and family know you are quitting so they can help and support you. Consider finding friends who also want to quit. It can be easier to quit with someone  else, so that you can support each other.  · Talk with your health care provider about using nicotine replacement medicines to help you quit, such as gum, lozenges, patches, sprays, or pills.  · Do not replace cigarette smoking with electronic cigarettes, which are commonly called e-cigarettes. The safety of e-cigarettes is not known, and some may contain harmful chemicals.  · If you try to quit but return to smoking, stay positive. It is common to slip up when you first quit, so take it one day at a time.  · Be prepared for cravings. When you feel the urge to smoke, chew gum or suck on hard candy.  Lifestyle  · Stay busy and take care of your body.  · Drink enough fluid to keep your urine pale yellow.  · Get plenty of exercise and eat a healthy diet. This can help prevent weight gain after quitting.  · Monitor your eating habits. Quitting smoking can cause you to have a larger appetite than when you smoke.  · Find ways to relax. Go out with friends or family to a movie or a restaurant where people do not smoke.  · Ask your health care provider about having regular tests (screenings) to check for cancer. This may include blood tests, imaging tests, and other tests.  · Find ways to manage your stress, such as meditation, yoga, or exercise.  Where to find support  To get support to quit smoking, consider:  · Asking your health care provider for more information and resources.  · Taking classes to learn more about quitting smoking.  · Looking for local organizations that offer resources about quitting smoking.  · Joining a support group for people who want to quit smoking in your local community.  · Calling the smokefree.gov counselor helpline: 1-800-Quit-Now (1-127.592.2190)  Where to find more information  You may find more information about quitting smoking from:  · HelpGuide.org: www.helpguide.org  · Smokefree.gov: smokefree.gov  · American Lung Association: www.lung.org  Contact a health care provider if  you:  · Have problems breathing.  · Notice that your lips, nose, or fingers turn blue.  · Have chest pain.  · Are coughing up blood.  · Feel faint or you pass out.  · Have other health changes that cause you to worry.  Summary  · Smoking tobacco can negatively affect your health, the health of those around you, your finances, and your social life.  · Do not start smoking. Quit if you already do. If you need help quitting, ask your health care provider.  · Think about joining a support group for people who want to quit smoking in your local community. There are many effective programs that will help you to quit this behavior.  This information is not intended to replace advice given to you by your health care provider. Make sure you discuss any questions you have with your health care provider.  Document Revised: 09/11/2020 Document Reviewed: 01/02/2018  [employer] Patient Education © 2020 [employer]

## 2021-02-01 NOTE — PROGRESS NOTES
Chief complaint:   Chief Complaint   Patient presents with   • Back Pain     Pt is here for back and leg pain.    Reports she has completed 6 sessions of PT with benefit.       Subjective     HPI:   Interval History: Debbie Lujan is a 60 y.o.  female who presents today for follow-up of lumbar back pain.  Recently completed a dedicated course of physician directed physical therapy with an overall reduction of muscle tension.  She currently complains of intermittent lumbar back pain with intermittent radicular pain that extends to the mid posterior lateral right thigh, and burning dysesthesias with frequent muscle cramps to the posterior lateral leg that extends to the lateral aspect of the foot.  She denies lower extremity numbness or tingling.  Her discomfort is worse upon waking, with prolonged standing, walking, and with physical activity to include activities of daily living.  Alleviating factors include rest, stretching, OTC Tylenol, meloxicam, and tizanidine which he obtains through her PCP.  In regards to her neck stiffness, she has as well noticed an improvement in her overall discomfort with physical therapy, as well as improved upper extremity dexterity and fine motor skills.  She states her neck stiffness is worse upon waking.  She currently denies upper extremity radicular pain, weakness, numbness, tingling, or frequently dropped items.  She additionally denies fevers, chills, night sweats, unexplained weight loss, gait or balance abnormalities, saddle anesthesia, or bowel or bladder dysfunction.  She currently rates the severity of her symptoms 2/10.  No additional concerns at this time.    Oswestry Disability Index = 42%   Score   Pain Intensity Very mild pain-1   Personal Care I can look after myself but it is slow and painful-2   Lifting Only very light weights-4   Walking Pain prevents > 1/4 mile-2   Sitting Pain prevents sitting > 1 hr-2   Standing Pain limits standing to < 1hr-2   Sleeping  Can only sleep < 6 hrs-2   Sex Life (if applicable) Not applicable-0   Social Life I do not go out as often because of pain-3   Traveling Pain restricts to < 1 hr-3   (Shital et al, 1980)    SCORE INTERPRETATION OF THE OSWESTRY LBP DISABILITY QUESTIONNAIRE     40-60% Severe disability Pain remains the main problem in this group of patients, but travel, personal care, social life, sexual activity, and sleep are also affected.  These patients require detailed investigation.     ROS  Review of Systems   Constitutional: Positive for activity change. Negative for chills, diaphoresis and fever.   HENT: Negative.    Eyes: Negative.    Respiratory: Negative.    Cardiovascular: Negative.    Gastrointestinal: Negative.    Endocrine: Negative.    Genitourinary: Negative.    Musculoskeletal: Positive for back pain and neck stiffness. Negative for gait problem.   Skin: Negative.    Allergic/Immunologic: Negative.    Neurological: Negative.  Negative for weakness and numbness.   Hematological: Negative.    Psychiatric/Behavioral: Negative.    All other systems reviewed and are negative.    PFSH:  History reviewed. No pertinent past medical history.    History reviewed. No pertinent surgical history.    Objective      Current Outpatient Medications   Medication Sig Dispense Refill   • almotriptan (AXERT) 12.5 MG tablet almotriptan malate 12.5 mg tablet   one daily     • Cholecalciferol (vitamin D3) 125 MCG (5000 UT) tablet tablet Take  by mouth.     • Choline Fenofibrate (Fenofibric Acid) 45 MG capsule delayed-release      • escitalopram (LEXAPRO) 10 MG tablet Take  by mouth.     • escitalopram (LEXAPRO) 10 MG tablet escitalopram 10 mg tablet   one daily     • estrogens, conjugated, (PREMARIN) 0.3 MG tablet Take 0.3 mg by mouth.     • hyoscyamine sulfate (ANASPAZ) 0.125 MG tablet dispersible disintegrating tablet 0.125 mg As Needed.     • meloxicam (MOBIC) 15 MG tablet Take 1 tablet by mouth Daily. 30 tablet 0   • omeprazole  "(priLOSEC) 20 MG capsule      • rosuvastatin (CRESTOR) 10 MG tablet rosuvastatin 10 mg tablet   one daily     • tiZANidine (ZANAFLEX) 2 MG tablet Take 2 tablets by mouth At Night As Needed for Muscle Spasms. 30 tablet 0   • valACYclovir (VALTREX) 500 MG tablet Take 500 mg by mouth.       No current facility-administered medications for this visit.        Vital Signs  Ht 157.5 cm (62\") Comment: pt reports  Wt 65.9 kg (145 lb 3.2 oz)   BMI 26.56 kg/m²   Physical Exam  Vitals signs and nursing note reviewed.   Constitutional:       General: She is not in acute distress.     Appearance: Normal appearance. She is well-developed and well-groomed. She is obese. She is not ill-appearing, toxic-appearing or diaphoretic.      Comments: BMI 25.56   HENT:      Head: Normocephalic and atraumatic.      Right Ear: Hearing normal.      Left Ear: Hearing normal.   Eyes:      Extraocular Movements: EOM normal.      Conjunctiva/sclera: Conjunctivae normal.      Pupils: Pupils are equal, round, and reactive to light.   Neck:      Musculoskeletal: Full passive range of motion without pain and neck supple.      Trachea: Trachea normal.   Cardiovascular:      Rate and Rhythm: Normal rate and regular rhythm.   Pulmonary:      Effort: Pulmonary effort is normal. No tachypnea, bradypnea, accessory muscle usage or respiratory distress.   Abdominal:      Palpations: Abdomen is soft.   Skin:     General: Skin is warm and dry.   Neurological:      Mental Status: She is alert and oriented to person, place, and time.      GCS: GCS eye subscore is 4. GCS verbal subscore is 5. GCS motor subscore is 6.      Gait: Gait is intact.      Deep Tendon Reflexes:      Reflex Scores:       Tricep reflexes are 3+ on the right side and 3+ on the left side.       Bicep reflexes are 3+ on the right side and 3+ on the left side.       Brachioradialis reflexes are 3+ on the right side and 3+ on the left side.       Patellar reflexes are 3+ on the right side and " 3+ on the left side.       Achilles reflexes are 3+ on the right side and 3+ on the left side.  Psychiatric:         Speech: Speech normal.         Behavior: Behavior normal. Behavior is cooperative.       Neurologic Exam     Mental Status   Oriented to person, place, and time.   Attention: normal. Concentration: normal.   Speech: speech is normal   Level of consciousness: alert    Cranial Nerves     CN II   Visual fields full to confrontation.     CN III, IV, VI   Pupils are equal, round, and reactive to light.  Extraocular motions are normal.     CN V   Facial sensation intact.     CN VII   Facial expression full, symmetric.     CN VIII   CN VIII normal.     CN IX, X   CN IX normal.     CN XI   CN XI normal.     Motor Exam   Right arm tone: normal  Left arm tone: normal  Right leg tone: normal  Left leg tone: normal    Strength   Right deltoid: 5/5  Left deltoid: 5/5  Right biceps: 5/5  Left biceps: 5/5  Right triceps: 5/5  Left triceps: 5/5  Right wrist extension: 5/5  Left wrist extension: 5/5  Right iliopsoas: 5/5  Left iliopsoas: 5/5  Right quadriceps: 5/5  Left quadriceps: 5/5  Right anterior tibial: 5/5  Left anterior tibial: 5/5  Right gastroc: 5/5  Left gastroc: 5/5  Right EHL 5/5  Left EHL 5/5       Sensory Exam   Right arm light touch: normal  Left arm light touch: normal  Right leg light touch: normal  Left leg light touch: normal    Gait, Coordination, and Reflexes     Gait  Gait: normal    Tremor   Resting tremor: absent  Intention tremor: absent  Action tremor: absent    Reflexes   Right brachioradialis: 3+  Left brachioradialis: 3+  Right biceps: 3+  Left biceps: 3+  Right triceps: 3+  Left triceps: 3+  Right patellar: 3+  Left patellar: 3+  Right achilles: 3+  Left achilles: 3+  Right plantar: equivocal  Left plantar: equivocal  Right Cary: absent  Left Cary: absent  Right ankle clonus: absent  Left ankle clonus: absent  Right pendular knee jerk: absent  Left pendular knee jerk: absent  (12  bullet pts)    Female  strength (pounds)  AGE Right Hand RH Norms Left Hand LH Norms   20-24   70+14.5   61+13.1   25-29   75+13.9   63.5+12   30-34   79+19.2   68+17.7   35-39   74+10.8   66+11.7   40-44   70+13.5   62+13.8   45-49   62+15.1   56+12.7   50-54   66+11.6   57+10.7   55-59   57+12.5   47+11.9   60-64 58, 50 55+10.1 45, 38 46+10.1   65-69   50+9.7   41+8.2   70-74   50+11.7   42+10.2   75+   43+11.0   38+8.9   (ANCA Saravia et al; Hand Dynometer: Effects of trials and sessions.  Perpetual and Motor Skills 61:195-8, 1985)  * = Dominant hand  > = Intervention    Results Review:   12/7/2020.  MRI of the lumbar spine shows no acute fractures or malalignment, the conus terminates at normal level, no STIR signal change, Modic endplate changes at L5-S1, Schmorl nodes at L1 and L2, multilevel level degenerative changes, right paracentral disc protrusion at T11-T12 without significant central canal or foraminal stenosis, central disc protrusion at T12-L1 resulting in thecal sac compression without central canal or foraminal stenosis, facet arthropathy and ligamentous flavum hypertrophy resulting in bilateral foraminal narrowing at L4-5 and L5-S1.  No significant central canal stenosis noted within the lumbar spine.                      12/21/2020 12/21/2020      Assessment/Plan: Debbie Lujan is a 60 y.o. female with a significant medical history of tobacco abuse, and she is overweight.   She presents today for follow-up of a known complaint of mechanical lumbar back and intermittent right leg pain in the S1 distribution, as well as neck stiffness.   VINH: 38, 42.  Physical exam findings of left  strength weakness, global strength 5/5, gross hyperreflexia without Cary's or clonus, equivocal plantar reflexes, and an antalgic gait.  Her imaging shows right paracentral disc protrusion at T11-T12 without significant central canal or foraminal stenosis, central disc protrusion at T12-L1 resulting in thecal  sac compression without central canal or foraminal stenosis, facet arthropathy and ligamentous flavum hypertrophy resulting in bilateral foraminal narrowing at L4-5 and L5-S1.  No significant central canal stenosis noted within the lumbar spine.  X-rays of the cervical spine show an anterolisthesis of C6 in comparison to C5 with multilevel degenerative changes.     TREATMENT RECOMMENDATIONS ...  Mechanical lumbar back pain  Bilateral leg pain  Defined back pain as worsened with sitting and standing and nearly relieved with rest.      Differential diagnosis include, but are not limited to degenerative facet arthropathy, degenerative lumbar stenosis, lumbar stenosis with neurogenic claudication, Lumbar stenosis with bilaeral lower extremity radiculopathy, spondyloarthropathies including ankylosis spondylitis (HLA-B27) or Paget's disease  , fibromyalgia or other rheumatological disease or malingering, conversion disorder, and secondary gain are diagnoses of exclusion.      Despite her continued complaints of lumbar back and intermittent right leg pain, her overall discomfort has improved with physical therapy.  For now, I recommended she continue conservative management.  We will have her return for reassessment with Dr. Valdovinos at his next available appointment.    Cervicalgia  Hyperreflexia  DDX:  Congenital: Chiari malformation, tethered cord, syringomyelia, hereditary spastic paraplegia  Acquired: Cervical or thoracic spinal stenosis, traumatic, herniated disc, kyphosis, extra medullary hematopoiesis, epidural lipomatosis, OPLL, Paget's disease  Neoplastic: Spinal cord tumors intra or extra medullary, carcinomatosis, paraneoplastic syndromes  Vascular: Epidural hematoma, spinal cord infarction, vascular malformation such as AVM  Iatrogenic: Radiation myelopathy  Infectious: Post viral or autoimmune.  Often seen in HIV, syphilis, cytomegalovirus, herpes simplex 2 and CJD  Demyelinating: Acute transverse myelitis,  multiple sclerosis, Devic's syndrome  Metabolic: Subacute combined systemic disease due to vitamin B-12 deficiency  Motor neuron disease: Amyotrophic lateral sclerosis, primary lateral sclerosis    Debbie presents with with a complaint of neck stiffness, however denies upper extremity radicular pain, weakness, numbness, or tingling.  Upon physical exam she was however found to be globally hyperreflexive and have left  strength weakness.  X-rays of the cervical spine show an anterolisthesis of C6 and multilevel degenerative changes.  Therefore we recommend ...  MRI cervical to rule out need for surgical intervention.    Again, we will have her return for reassessment with Dr. Valdovinos at his next available appointment.  I advised the patient to call to return sooner for new or worsening complaints of weakness, paresthesias, gait disturbances, or any additional concerns.  Treatment options discussed in detail with Debbie and she voiced understanding.  Ms. Lujan agrees with this plan of care.     Tobacco abuse  The patient understands the many dangers of continuing to use tobacco. Despite this, Ms. Lujan states quitting is not an immediate priority at this time and declines to discuss tobacco cessation.  I reminded the patient that if quitting becomes an increased priority to contact us for help with quitting.        Overweight: 25.0-29.9kg/m2   Body mass index is 26.56 kg/m².  Information on the DASH diet provided in the AVS.  We will continue to provided diet and exercise information with the goal of weight loss at each scheduled appointment.     Diagnoses and all orders for this visit:    1. Lumbar back pain (Primary)  -     Ambulatory Referral to Pain Management    2. Pain of right lower extremity  -     Ambulatory Referral to Pain Management    3. Cervicalgia  -     MRI Cervical Spine Without Contrast; Future  -     Ambulatory Referral to Pain Management    4. Hyperreflexia  -     Ambulatory Referral to Pain  Management    5. Tobacco abuse    6. Overweight (BMI 25.0-29.9)      Return for FOLLOW UP WITH DR. CHERYL HUNTLEY AVALAIBLE.    Level of Risk: Moderate due to: undiagnosed new problem  MDM: Moderate Complexity  (Mod = 42457, High = 13654)    Thank you, for allowing me to continue to participate in the care of this patient.    Sincerely,  JIM Franco

## 2021-02-09 ENCOUNTER — HOSPITAL ENCOUNTER (OUTPATIENT)
Dept: MRI IMAGING | Facility: HOSPITAL | Age: 61
Discharge: HOME | End: 2021-02-09
Admitting: NURSE PRACTITIONER

## 2021-02-09 DIAGNOSIS — M54.2 CERVICALGIA: ICD-10-CM

## 2021-02-09 PROCEDURE — 72141 MRI NECK SPINE W/O DYE: CPT

## 2021-02-10 ENCOUNTER — TELEPHONE (OUTPATIENT)
Dept: NEUROSURGERY | Facility: CLINIC | Age: 61
End: 2021-02-10

## 2021-02-10 NOTE — TELEPHONE ENCOUNTER
----- Message from Elias Valdovinos MD sent at 2/9/2021  3:34 PM CST -----  C5/6, C6/7 ACDF discussion.  2 weeks.

## 2021-02-10 NOTE — TELEPHONE ENCOUNTER
LVM with patient requesting call back to discuss, will provide appointment 03/04/21. My direct extension given on VM.

## 2021-02-22 PROCEDURE — U0004 COV-19 TEST NON-CDC HGH THRU: HCPCS | Performed by: NURSE PRACTITIONER

## 2021-03-04 ENCOUNTER — OFFICE VISIT (OUTPATIENT)
Dept: NEUROSURGERY | Facility: CLINIC | Age: 61
End: 2021-03-04

## 2021-03-04 ENCOUNTER — LAB (OUTPATIENT)
Dept: LAB | Facility: HOSPITAL | Age: 61
End: 2021-03-04

## 2021-03-04 VITALS — HEIGHT: 62 IN | WEIGHT: 145 LBS | BODY MASS INDEX: 26.68 KG/M2

## 2021-03-04 DIAGNOSIS — M51.36 DEGENERATION OF LUMBAR INTERVERTEBRAL DISC: Primary | ICD-10-CM

## 2021-03-04 DIAGNOSIS — F17.210 CIGARETTE SMOKER: ICD-10-CM

## 2021-03-04 DIAGNOSIS — R20.0 NUMBNESS IN BOTH HANDS: ICD-10-CM

## 2021-03-04 DIAGNOSIS — M51.36 DEGENERATION OF LUMBAR INTERVERTEBRAL DISC: ICD-10-CM

## 2021-03-04 DIAGNOSIS — E66.3 OVERWEIGHT (BMI 25.0-29.9): ICD-10-CM

## 2021-03-04 DIAGNOSIS — M50.30 DEGENERATION OF CERVICAL INTERVERTEBRAL DISC: ICD-10-CM

## 2021-03-04 DIAGNOSIS — R20.0 NUMBNESS IN BOTH LEGS: ICD-10-CM

## 2021-03-04 PROBLEM — M79.605 PAIN IN BOTH LOWER EXTREMITIES: Status: RESOLVED | Noted: 2020-12-21 | Resolved: 2021-03-04

## 2021-03-04 PROBLEM — M79.604 PAIN IN BOTH LOWER EXTREMITIES: Status: RESOLVED | Noted: 2020-12-21 | Resolved: 2021-03-04

## 2021-03-04 PROBLEM — M54.2 CERVICALGIA: Status: RESOLVED | Noted: 2021-02-01 | Resolved: 2021-03-04

## 2021-03-04 PROBLEM — M54.50 LUMBAR BACK PAIN: Status: RESOLVED | Noted: 2020-12-21 | Resolved: 2021-03-04

## 2021-03-04 PROBLEM — R29.2 HYPERREFLEXIA: Status: RESOLVED | Noted: 2021-02-01 | Resolved: 2021-03-04

## 2021-03-04 LAB
ALT SERPL W P-5'-P-CCNC: 9 U/L (ref 1–33)
AST SERPL-CCNC: 14 U/L (ref 1–32)
CK SERPL-CCNC: 50 U/L (ref 20–180)
LDH SERPL-CCNC: 192 U/L (ref 135–214)

## 2021-03-04 PROCEDURE — 36415 COLL VENOUS BLD VENIPUNCTURE: CPT

## 2021-03-04 PROCEDURE — 85610 PROTHROMBIN TIME: CPT

## 2021-03-04 PROCEDURE — 85598 HEXAGNAL PHOSPH PLTLT NEUTRL: CPT

## 2021-03-04 PROCEDURE — 84460 ALANINE AMINO (ALT) (SGPT): CPT

## 2021-03-04 PROCEDURE — 85670 THROMBIN TIME PLASMA: CPT

## 2021-03-04 PROCEDURE — 82784 ASSAY IGA/IGD/IGG/IGM EACH: CPT

## 2021-03-04 PROCEDURE — 86235 NUCLEAR ANTIGEN ANTIBODY: CPT

## 2021-03-04 PROCEDURE — 85613 RUSSELL VIPER VENOM DILUTED: CPT

## 2021-03-04 PROCEDURE — 82085 ASSAY OF ALDOLASE: CPT

## 2021-03-04 PROCEDURE — 83615 LACTATE (LD) (LDH) ENZYME: CPT

## 2021-03-04 PROCEDURE — 82550 ASSAY OF CK (CPK): CPT

## 2021-03-04 PROCEDURE — 86147 CARDIOLIPIN ANTIBODY EA IG: CPT

## 2021-03-04 PROCEDURE — 86431 RHEUMATOID FACTOR QUANT: CPT

## 2021-03-04 PROCEDURE — 86225 DNA ANTIBODY NATIVE: CPT

## 2021-03-04 PROCEDURE — 86146 BETA-2 GLYCOPROTEIN ANTIBODY: CPT

## 2021-03-04 PROCEDURE — 99215 OFFICE O/P EST HI 40 MIN: CPT | Performed by: NEUROLOGICAL SURGERY

## 2021-03-04 PROCEDURE — 86038 ANTINUCLEAR ANTIBODIES: CPT

## 2021-03-04 PROCEDURE — 84450 TRANSFERASE (AST) (SGOT): CPT

## 2021-03-04 PROCEDURE — 85730 THROMBOPLASTIN TIME PARTIAL: CPT

## 2021-03-04 NOTE — PATIENT INSTRUCTIONS
"PATIENT TO CONTINUE TO FOLLOW UP WITH HIS/HER PRIMARY CARE PROVIDER FOR YEARLY PHYSICAL EXAMS TO ENSURE COMPLETE HEALTH MAINTENANCE    DASH Eating Plan  DASH stands for \"Dietary Approaches to Stop Hypertension.\" The DASH eating plan is a healthy eating plan that has been shown to reduce high blood pressure (hypertension). It may also reduce your risk for type 2 diabetes, heart disease, and stroke. The DASH eating plan may also help with weight loss.  What are tips for following this plan?    General guidelines  · Avoid eating more than 2,300 mg (milligrams) of salt (sodium) a day. If you have hypertension, you may need to reduce your sodium intake to 1,500 mg a day.  · Limit alcohol intake to no more than 1 drink a day for nonpregnant women and 2 drinks a day for men. One drink equals 12 oz of beer, 5 oz of wine, or 1½ oz of hard liquor.  · Work with your health care provider to maintain a healthy body weight or to lose weight. Ask what an ideal weight is for you.  · Get at least 30 minutes of exercise that causes your heart to beat faster (aerobic exercise) most days of the week. Activities may include walking, swimming, or biking.  · Work with your health care provider or diet and nutrition specialist (dietitian) to adjust your eating plan to your individual calorie needs.  Reading food labels    · Check food labels for the amount of sodium per serving. Choose foods with less than 5 percent of the Daily Value of sodium. Generally, foods with less than 300 mg of sodium per serving fit into this eating plan.  · To find whole grains, look for the word \"whole\" as the first word in the ingredient list.  Shopping  · Buy products labeled as \"low-sodium\" or \"no salt added.\"  · Buy fresh foods. Avoid canned foods and premade or frozen meals.  Cooking  · Avoid adding salt when cooking. Use salt-free seasonings or herbs instead of table salt or sea salt. Check with your health care provider or pharmacist before using salt " substitutes.  · Do not fan foods. Cook foods using healthy methods such as baking, boiling, grilling, and broiling instead.  · Cook with heart-healthy oils, such as olive, canola, soybean, or sunflower oil.  Meal planning  · Eat a balanced diet that includes:  ? 5 or more servings of fruits and vegetables each day. At each meal, try to fill half of your plate with fruits and vegetables.  ? Up to 6-8 servings of whole grains each day.  ? Less than 6 oz of lean meat, poultry, or fish each day. A 3-oz serving of meat is about the same size as a deck of cards. One egg equals 1 oz.  ? 2 servings of low-fat dairy each day.  ? A serving of nuts, seeds, or beans 5 times each week.  ? Heart-healthy fats. Healthy fats called Omega-3 fatty acids are found in foods such as flaxseeds and coldwater fish, like sardines, salmon, and mackerel.  · Limit how much you eat of the following:  ? Canned or prepackaged foods.  ? Food that is high in trans fat, such as fried foods.  ? Food that is high in saturated fat, such as fatty meat.  ? Sweets, desserts, sugary drinks, and other foods with added sugar.  ? Full-fat dairy products.  · Do not salt foods before eating.  · Try to eat at least 2 vegetarian meals each week.  · Eat more home-cooked food and less restaurant, buffet, and fast food.  · When eating at a restaurant, ask that your food be prepared with less salt or no salt, if possible.  What foods are recommended?  The items listed may not be a complete list. Talk with your dietitian about what dietary choices are best for you.  Grains  Whole-grain or whole-wheat bread. Whole-grain or whole-wheat pasta. Brown rice. Oatmeal. Quinoa. Bulgur. Whole-grain and low-sodium cereals. Makenna bread. Low-fat, low-sodium crackers. Whole-wheat flour tortillas.  Vegetables  Fresh or frozen vegetables (raw, steamed, roasted, or grilled). Low-sodium or reduced-sodium tomato and vegetable juice. Low-sodium or reduced-sodium tomato sauce and tomato  paste. Low-sodium or reduced-sodium canned vegetables.  Fruits  All fresh, dried, or frozen fruit. Canned fruit in natural juice (without added sugar).  Meat and other protein foods  Skinless chicken or turkey. Ground chicken or turkey. Pork with fat trimmed off. Fish and seafood. Egg whites. Dried beans, peas, or lentils. Unsalted nuts, nut butters, and seeds. Unsalted canned beans. Lean cuts of beef with fat trimmed off. Low-sodium, lean deli meat.  Dairy  Low-fat (1%) or fat-free (skim) milk. Fat-free, low-fat, or reduced-fat cheeses. Nonfat, low-sodium ricotta or cottage cheese. Low-fat or nonfat yogurt. Low-fat, low-sodium cheese.  Fats and oils  Soft margarine without trans fats. Vegetable oil. Low-fat, reduced-fat, or light mayonnaise and salad dressings (reduced-sodium). Canola, safflower, olive, soybean, and sunflower oils. Avocado.  Seasoning and other foods  Herbs. Spices. Seasoning mixes without salt. Unsalted popcorn and pretzels. Fat-free sweets.  What foods are not recommended?  The items listed may not be a complete list. Talk with your dietitian about what dietary choices are best for you.  Grains  Baked goods made with fat, such as croissants, muffins, or some breads. Dry pasta or rice meal packs.  Vegetables  Creamed or fried vegetables. Vegetables in a cheese sauce. Regular canned vegetables (not low-sodium or reduced-sodium). Regular canned tomato sauce and paste (not low-sodium or reduced-sodium). Regular tomato and vegetable juice (not low-sodium or reduced-sodium). Pickles. Olives.  Fruits  Canned fruit in a light or heavy syrup. Fried fruit. Fruit in cream or butter sauce.  Meat and other protein foods  Fatty cuts of meat. Ribs. Fried meat. Feliz. Sausage. Bologna and other processed lunch meats. Salami. Fatback. Hotdogs. Bratwurst. Salted nuts and seeds. Canned beans with added salt. Canned or smoked fish. Whole eggs or egg yolks. Chicken or turkey with skin.  Dairy  Whole or 2% milk,  cream, and half-and-half. Whole or full-fat cream cheese. Whole-fat or sweetened yogurt. Full-fat cheese. Nondairy creamers. Whipped toppings. Processed cheese and cheese spreads.  Fats and oils  Butter. Stick margarine. Lard. Shortening. Ghee. Feliz fat. Tropical oils, such as coconut, palm kernel, or palm oil.  Seasoning and other foods  Salted popcorn and pretzels. Onion salt, garlic salt, seasoned salt, table salt, and sea salt. Worcestershire sauce. Tartar sauce. Barbecue sauce. Teriyaki sauce. Soy sauce, including reduced-sodium. Steak sauce. Canned and packaged gravies. Fish sauce. Oyster sauce. Cocktail sauce. Horseradish that you find on the shelf. Ketchup. Mustard. Meat flavorings and tenderizers. Bouillon cubes. Hot sauce and Tabasco sauce. Premade or packaged marinades. Premade or packaged taco seasonings. Relishes. Regular salad dressings.  Where to find more information:  · National Heart, Lung, and Blood [place]: www.nhlbi.nih.gov  · American Heart Association: www.heart.org  Summary  · The DASH eating plan is a healthy eating plan that has been shown to reduce high blood pressure (hypertension). It may also reduce your risk for type 2 diabetes, heart disease, and stroke.  · With the DASH eating plan, you should limit salt (sodium) intake to 2,300 mg a day. If you have hypertension, you may need to reduce your sodium intake to 1,500 mg a day.  · When on the DASH eating plan, aim to eat more fresh fruits and vegetables, whole grains, lean proteins, low-fat dairy, and heart-healthy fats.  · Work with your health care provider or diet and nutrition specialist (dietitian) to adjust your eating plan to your individual calorie needs.  This information is not intended to replace advice given to you by your health care provider. Make sure you discuss any questions you have with your health care provider.  Document Revised: 11/30/2018 Document Reviewed: 12/11/2017  Elsevier Patient Education © 2020 [employer]
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
## 2021-03-05 ENCOUNTER — LAB (OUTPATIENT)
Dept: LAB | Facility: HOSPITAL | Age: 61
End: 2021-03-05

## 2021-03-05 LAB
ALDOLASE SERPL-CCNC: 3.6 U/L (ref 3.3–10.3)
ANA SER QL: NEGATIVE
CHROMATIN AB SERPL-ACNC: <10 IU/ML (ref 0–14)
DSDNA AB SER-ACNC: 1 IU/ML (ref 0–9)
ENA SM AB SER-ACNC: <0.2 AI (ref 0–0.9)
ENA SS-A AB SER-ACNC: <0.2 AI (ref 0–0.9)
ENA SS-B AB SER-ACNC: <0.2 AI (ref 0–0.9)
IGA1 MFR SER: 294 MG/DL (ref 70–400)

## 2021-03-05 PROCEDURE — 81374 HLA I TYPING 1 ANTIGEN LR: CPT

## 2021-03-07 LAB
APTT HEX PL PPP: 0 SEC (ref 0–11)
APTT PPP: 25.3 SEC (ref 22.9–30.2)
B2 GLYCOPROT1 IGG SER-ACNC: <9 GPI IGG UNITS (ref 0–20)
B2 GLYCOPROT1 IGM SER-ACNC: <9 GPI IGM UNITS (ref 0–32)
CARDIOLIPIN IGG SER IA-ACNC: <9 GPL U/ML (ref 0–14)
CARDIOLIPIN IGM SER IA-ACNC: 70 MPL U/ML (ref 0–12)
INR PPP: 1 (ref 0.9–1.2)
PATH INTERP SPEC-IMP: ABNORMAL
PROTHROMBIN TIME: 10.6 SEC (ref 9.1–12)
SCREEN DRVVT: 31.3 SEC (ref 0–47)
THROMBIN TIME: 17.2 SEC (ref 0–23)

## 2021-03-10 LAB — PATH INTERP BLD-IMP: NORMAL

## 2021-03-12 LAB — HLA-B27 QL NAA+PROBE: NEGATIVE

## 2021-03-29 ENCOUNTER — HOSPITAL ENCOUNTER (OUTPATIENT)
Dept: NEUROLOGY | Facility: HOSPITAL | Age: 61
Discharge: HOME | End: 2021-03-29
Admitting: NEUROLOGICAL SURGERY

## 2021-03-29 ENCOUNTER — TELEPHONE (OUTPATIENT)
Dept: NEUROSURGERY | Facility: CLINIC | Age: 61
End: 2021-03-29

## 2021-03-29 DIAGNOSIS — M50.30 DEGENERATION OF CERVICAL INTERVERTEBRAL DISC: ICD-10-CM

## 2021-03-29 DIAGNOSIS — R20.0 NUMBNESS IN BOTH HANDS: ICD-10-CM

## 2021-03-29 DIAGNOSIS — M51.36 DEGENERATION OF LUMBAR INTERVERTEBRAL DISC: ICD-10-CM

## 2021-03-29 DIAGNOSIS — R20.0 NUMBNESS IN BOTH LEGS: ICD-10-CM

## 2021-03-29 PROCEDURE — 95911 NRV CNDJ TEST 9-10 STUDIES: CPT

## 2021-03-29 PROCEDURE — 95886 MUSC TEST DONE W/N TEST COMP: CPT

## 2021-03-29 NOTE — TELEPHONE ENCOUNTER
Patient contacted. She would like to defer referral to Rheumatology currently and continue with pain management. Also, she was advised for EMG/NCV BLE results. Would keep appointment for BUE tomorrow, and return appointment with JIM Sandoval.

## 2021-03-29 NOTE — TELEPHONE ENCOUNTER
----- Message from Elias Valdovinos MD sent at 3/29/2021  2:51 PM CDT -----  Her anticardiolipid IgM is mildly elevated.  This can be caused by an inflammatory condition.  Referral to rheumatology.

## 2021-03-29 NOTE — PROGRESS NOTES
Contacted, aware. Declines Rheumatology referral as of now, will continue with PM. Will send copy of results to PM provider.

## 2021-03-29 NOTE — PROGRESS NOTES
Contacted, aware. Advised to keep appointment for BUE tomorrow and return appointment with JIM Sandoval.

## 2021-03-29 NOTE — PROGRESS NOTES
Her anticardiolipid IgM is mildly elevated.  This can be caused by an inflammatory condition.  Referral to rheumatology.

## 2021-03-30 ENCOUNTER — HOSPITAL ENCOUNTER (OUTPATIENT)
Dept: NEUROLOGY | Facility: HOSPITAL | Age: 61
Discharge: HOME | End: 2021-03-30
Admitting: NEUROLOGICAL SURGERY

## 2021-03-30 DIAGNOSIS — R20.0 NUMBNESS IN BOTH LEGS: ICD-10-CM

## 2021-03-30 DIAGNOSIS — R20.0 NUMBNESS IN BOTH HANDS: ICD-10-CM

## 2021-03-30 DIAGNOSIS — M50.30 DEGENERATION OF CERVICAL INTERVERTEBRAL DISC: ICD-10-CM

## 2021-03-30 DIAGNOSIS — M51.36 DEGENERATION OF LUMBAR INTERVERTEBRAL DISC: ICD-10-CM

## 2021-03-30 PROCEDURE — 95911 NRV CNDJ TEST 9-10 STUDIES: CPT

## 2021-03-30 PROCEDURE — 95886 MUSC TEST DONE W/N TEST COMP: CPT

## 2021-04-14 ENCOUNTER — OFFICE VISIT (OUTPATIENT)
Dept: NEUROSURGERY | Facility: CLINIC | Age: 61
End: 2021-04-14

## 2021-04-14 VITALS — BODY MASS INDEX: 26.68 KG/M2 | WEIGHT: 145 LBS | HEIGHT: 62 IN

## 2021-04-14 DIAGNOSIS — R29.2 HYPERREFLEXIA: ICD-10-CM

## 2021-04-14 DIAGNOSIS — M50.30 DEGENERATION OF CERVICAL INTERVERTEBRAL DISC: ICD-10-CM

## 2021-04-14 DIAGNOSIS — R20.0 NUMBNESS IN BOTH HANDS: ICD-10-CM

## 2021-04-14 DIAGNOSIS — F17.210 CIGARETTE SMOKER: ICD-10-CM

## 2021-04-14 DIAGNOSIS — R76.0 ANTICARDIOLIPIN ANTIBODY POSITIVE: ICD-10-CM

## 2021-04-14 DIAGNOSIS — E66.3 OVERWEIGHT (BMI 25.0-29.9): ICD-10-CM

## 2021-04-14 DIAGNOSIS — G56.01 RIGHT CARPAL TUNNEL SYNDROME: ICD-10-CM

## 2021-04-14 DIAGNOSIS — M51.36 DEGENERATION OF LUMBAR INTERVERTEBRAL DISC: ICD-10-CM

## 2021-04-14 DIAGNOSIS — M54.2 CERVICALGIA: Primary | ICD-10-CM

## 2021-04-14 DIAGNOSIS — M54.50 LUMBAR BACK PAIN: ICD-10-CM

## 2021-04-14 DIAGNOSIS — M79.604 PAIN OF RIGHT LOWER EXTREMITY: ICD-10-CM

## 2021-04-14 PROCEDURE — 99214 OFFICE O/P EST MOD 30 MIN: CPT | Performed by: NURSE PRACTITIONER

## 2021-04-14 NOTE — PATIENT INSTRUCTIONS
"https://www.nhlbi.nih.gov/files/docs/public/heart/dash_brief.pdf\">   DASH Eating Plan  DASH stands for Dietary Approaches to Stop Hypertension. The DASH eating plan is a healthy eating plan that has been shown to:  · Reduce high blood pressure (hypertension).  · Reduce your risk for type 2 diabetes, heart disease, and stroke.  · Help with weight loss.  What are tips for following this plan?  Reading food labels  · Check food labels for the amount of salt (sodium) per serving. Choose foods with less than 5 percent of the Daily Value of sodium. Generally, foods with less than 300 milligrams (mg) of sodium per serving fit into this eating plan.  · To find whole grains, look for the word \"whole\" as the first word in the ingredient list.  Shopping  · Buy products labeled as \"low-sodium\" or \"no salt added.\"  · Buy fresh foods. Avoid canned foods and pre-made or frozen meals.  Cooking  · Avoid adding salt when cooking. Use salt-free seasonings or herbs instead of table salt or sea salt. Check with your health care provider or pharmacist before using salt substitutes.  · Do not fan foods. Cook foods using healthy methods such as baking, boiling, grilling, roasting, and broiling instead.  · Cook with heart-healthy oils, such as olive, canola, avocado, soybean, or sunflower oil.  Meal planning    · Eat a balanced diet that includes:  ? 4 or more servings of fruits and 4 or more servings of vegetables each day. Try to fill one-half of your plate with fruits and vegetables.  ? 6-8 servings of whole grains each day.  ? Less than 6 oz (170 g) of lean meat, poultry, or fish each day. A 3-oz (85-g) serving of meat is about the same size as a deck of cards. One egg equals 1 oz (28 g).  ? 2-3 servings of low-fat dairy each day. One serving is 1 cup (237 mL).  ? 1 serving of nuts, seeds, or beans 5 times each week.  ? 2-3 servings of heart-healthy fats. Healthy fats called omega-3 fatty acids are found in foods such as walnuts, " flaxseeds, fortified milks, and eggs. These fats are also found in cold-water fish, such as sardines, salmon, and mackerel.  · Limit how much you eat of:  ? Canned or prepackaged foods.  ? Food that is high in trans fat, such as some fried foods.  ? Food that is high in saturated fat, such as fatty meat.  ? Desserts and other sweets, sugary drinks, and other foods with added sugar.  ? Full-fat dairy products.  · Do not salt foods before eating.  · Do not eat more than 4 egg yolks a week.  · Try to eat at least 2 vegetarian meals a week.  · Eat more home-cooked food and less restaurant, buffet, and fast food.  Lifestyle  · When eating at a restaurant, ask that your food be prepared with less salt or no salt, if possible.  · If you drink alcohol:  ? Limit how much you use to:  § 0-1 drink a day for women who are not pregnant.  § 0-2 drinks a day for men.  ? Be aware of how much alcohol is in your drink. In the [place] one drink equals one 12 oz bottle of beer (355 mL), one 5 oz glass of wine (148 mL), or one 1½ oz glass of hard liquor (44 mL).  General information  · Avoid eating more than 2,300 mg of salt a day. If you have hypertension, you may need to reduce your sodium intake to 1,500 mg a day.  · Work with your health care provider to maintain a healthy body weight or to lose weight. Ask what an ideal weight is for you.  · Get at least 30 minutes of exercise that causes your heart to beat faster (aerobic exercise) most days of the week. Activities may include walking, swimming, or biking.  · Work with your health care provider or dietitian to adjust your eating plan to your individual calorie needs.  What foods should I eat?  Fruits  All fresh, dried, or frozen fruit. Canned fruit in natural juice (without added sugar).  Vegetables  Fresh or frozen vegetables (raw, steamed, roasted, or grilled). Low-sodium or reduced-sodium tomato and vegetable juice. Low-sodium or reduced-sodium tomato sauce and tomato paste.  Low-sodium or reduced-sodium canned vegetables.  Grains  Whole-grain or whole-wheat bread. Whole-grain or whole-wheat pasta. Brown rice. Oatmeal. Quinoa. Bulgur. Whole-grain and low-sodium cereals. Makenna bread. Low-fat, low-sodium crackers. Whole-wheat flour tortillas.  Meats and other proteins  Skinless chicken or turkey. Ground chicken or turkey. Pork with fat trimmed off. Fish and seafood. Egg whites. Dried beans, peas, or lentils. Unsalted nuts, nut butters, and seeds. Unsalted canned beans. Lean cuts of beef with fat trimmed off. Low-sodium, lean precooked or cured meat, such as sausages or meat loaves.  Dairy  Low-fat (1%) or fat-free (skim) milk. Reduced-fat, low-fat, or fat-free cheeses. Nonfat, low-sodium ricotta or cottage cheese. Low-fat or nonfat yogurt. Low-fat, low-sodium cheese.  Fats and oils  Soft margarine without trans fats. Vegetable oil. Reduced-fat, low-fat, or light mayonnaise and salad dressings (reduced-sodium). Canola, safflower, olive, avocado, soybean, and sunflower oils. Avocado.  Seasonings and condiments  Herbs. Spices. Seasoning mixes without salt.  Other foods  Unsalted popcorn and pretzels. Fat-free sweets.  The items listed above may not be a complete list of foods and beverages you can eat. Contact a dietitian for more information.  What foods should I avoid?  Fruits  Canned fruit in a light or heavy syrup. Fried fruit. Fruit in cream or butter sauce.  Vegetables  Creamed or fried vegetables. Vegetables in a cheese sauce. Regular canned vegetables (not low-sodium or reduced-sodium). Regular canned tomato sauce and paste (not low-sodium or reduced-sodium). Regular tomato and vegetable juice (not low-sodium or reduced-sodium). Pickles. Olives.  Grains  Baked goods made with fat, such as croissants, muffins, or some breads. Dry pasta or rice meal packs.  Meats and other proteins  Fatty cuts of meat. Ribs. Fried meat. Feliz. Bologna, salami, and other precooked or cured meats, such as  sausages or meat loaves. Fat from the back of a pig (fatback). Bratwurst. Salted nuts and seeds. Canned beans with added salt. Canned or smoked fish. Whole eggs or egg yolks. Chicken or turkey with skin.  Dairy  Whole or 2% milk, cream, and half-and-half. Whole or full-fat cream cheese. Whole-fat or sweetened yogurt. Full-fat cheese. Nondairy creamers. Whipped toppings. Processed cheese and cheese spreads.  Fats and oils  Butter. Stick margarine. Lard. Shortening. Ghee. Feliz fat. Tropical oils, such as coconut, palm kernel, or palm oil.  Seasonings and condiments  Onion salt, garlic salt, seasoned salt, table salt, and sea salt. Worcestershire sauce. Tartar sauce. Barbecue sauce. Teriyaki sauce. Soy sauce, including reduced-sodium. Steak sauce. Canned and packaged gravies. Fish sauce. Oyster sauce. Cocktail sauce. Store-bought horseradish. Ketchup. Mustard. Meat flavorings and tenderizers. Bouillon cubes. Hot sauces. Pre-made or packaged marinades. Pre-made or packaged taco seasonings. Relishes. Regular salad dressings.  Other foods  Salted popcorn and pretzels.  The items listed above may not be a complete list of foods and beverages you should avoid. Contact a dietitian for more information.  Where to find more information  · National Heart, Lung, and [employer] [place]: www.nhlbi.nih.gov  · American Heart Association: www.heart.org  · Academy of Nutrition and Dietetics: www.eatright.org  · National Kidney Foundation: www.kidney.org  Summary  · The DASH eating plan is a healthy eating plan that has been shown to reduce high blood pressure (hypertension). It may also reduce your risk for type 2 diabetes, heart disease, and stroke.  · When on the DASH eating plan, aim to eat more fresh fruits and vegetables, whole grains, lean proteins, low-fat dairy, and heart-healthy fats.  · With the DASH eating plan, you should limit salt (sodium) intake to 2,300 mg a day. If you have hypertension, you may need to reduce your  sodium intake to 1,500 mg a day.  · Work with your health care provider or dietitian to adjust your eating plan to your individual calorie needs.  This information is not intended to replace advice given to you by your health care provider. Make sure you discuss any questions you have with your health care provider.  Document Revised: 11/20/2020 Document Reviewed: 11/20/2020  [employer] Patient Education © 2021 [employer]      Tobacco Use Disorder  Tobacco use disorder (TUD) occurs when a person craves, seeks, and uses tobacco, regardless of the consequences. This disorder can cause problems with mental and physical health. It can affect your ability to have healthy relationships, and it can keep you from meeting your responsibilities at work, home, or school.  Tobacco may be:  · Smoked as a cigarette or cigar.  · Inhaled using e-cigarettes.  · Smoked in a pipe or hookah.  · Chewed as smokeless tobacco.  · Inhaled into the nostrils as snuff.  Tobacco products contain a dangerous chemical called nicotine, which is very addictive. Nicotine triggers hormones that make the body feel stimulated and works on areas of the brain that make you feel good. These effects can make it hard for people to quit nicotine.  Tobacco contains many other unsafe chemicals that can damage almost every organ in the body. Smoking tobacco also puts others in danger due to fire risk and possible health problems caused by breathing in secondhand smoke.  What are the signs or symptoms?  Symptoms of TUD may include:  · Being unable to slow down or stop your tobacco use.  · Spending an abnormal amount of time getting or using tobacco.  · Craving tobacco. Cravings may last for up to 6 months after quitting.  · Tobacco use that:  ? Interferes with your work, school, or home life.  ? Interferes with your personal and social relationships.  ? Makes you give up activities that you once enjoyed or found important.  · Using tobacco even though you know  that it is:  ? Dangerous or bad for your health or someone else's health.  ? Causing problems in your life.  · Needing more and more of the substance to get the same effect (developing tolerance).  · Experiencing unpleasant symptoms if you do not use the substance (withdrawal). Withdrawal symptoms may include:  ? Depressed, anxious, or irritable mood.  ? Difficulty concentrating.  ? Increased appetite.  ? Restlessness or trouble sleeping.  · Using the substance to avoid withdrawal.  How is this diagnosed?  This condition may be diagnosed based on:  · Your current and past tobacco use. Your health care provider may ask questions about how your tobacco use affects your life.  · A physical exam.  You may be diagnosed with TUD if you have at least two symptoms within a 12-month period.  How is this treated?  This condition is treated by stopping tobacco use. Many people are unable to quit on their own and need help. Treatment may include:  · Nicotine replacement therapy (NRT). NRT provides nicotine without the other harmful chemicals in tobacco. NRT gradually lowers the dosage of nicotine in the body and reduces withdrawal symptoms. NRT is available as:  ? Over-the-counter gums, lozenges, and skin patches.  ? Prescription mouth inhalers and nasal sprays.  · Medicine that acts on the brain to reduce cravings and withdrawal symptoms.  · A type of talk therapy that examines your triggers for tobacco use, how to avoid them, and how to cope with cravings (behavioral therapy).  · Hypnosis. This may help with withdrawal symptoms.  · Joining a support group for others coping with TUD.  The best treatment for TUD is usually a combination of medicine, talk therapy, and support groups. Recovery can be a long process. Many people start using tobacco again after stopping (relapse). If you relapse, it does not mean that treatment will not work.  Follow these instructions at home:    Lifestyle  · Do not use any products that contain  nicotine or tobacco, such as cigarettes and e-cigarettes.  · Avoid things that trigger tobacco use as much as you can. Triggers include people and situations that usually cause you to use tobacco.  · Avoid drinks that contain caffeine, including coffee. These may worsen some withdrawal symptoms.  · Find ways to manage stress. Wanting to smoke may cause stress, and stress can make you want to smoke. Relaxation techniques such as deep breathing, meditation, and yoga may help.  · Attend support groups as needed. These groups are an important part of long-term recovery for many people.  General instructions  · Take over-the-counter and prescription medicines only as told by your health care provider.  · Check with your health care provider before taking any new prescription or over-the-counter medicines.  · Decide on a friend, family member, or smoking quit-line (such as 1-800-QUIT-NOW in the [place] that you can call or text when you feel the urge to smoke or when you need help coping with cravings.  · Keep all follow-up visits as told by your health care provider and therapist. This is important.  Contact a health care provider if:  · You are not able to take your medicines as prescribed.  · Your symptoms get worse, even with treatment.  Summary  · Tobacco use disorder (TUD) occurs when a person craves, seeks, and uses tobacco regardless of the consequences.  · This condition may be diagnosed based on your current and past tobacco use and a physical exam.  · Many people are unable to quit on their own and need help. Recovery can be a long process.  · The most effective treatment for TUD is usually a combination of medicine, talk therapy, and support groups.  This information is not intended to replace advice given to you by your health care provider. Make sure you discuss any questions you have with your health care provider.  Document Revised: 12/05/2018 Document Reviewed: 12/05/2018  [employer] Patient Education © 2021  [employer]

## 2021-04-14 NOTE — PROGRESS NOTES
"Chief complaint:   Chief Complaint   Patient presents with   • Back Pain     Pt is here for followup of her lumbar back pain.       Subjective     HPI:   Previous encounter: 3/4/2021    Interval History: Debbie Lujan is a 60 y.o.  female who presents today for follow-up of both neck and lumbar back pain.  Ms. Lujan has done extremely well since we last saw her.  Since she was initially evaluated she has completed a dedicated course of physician directed physical therapy as well as initiated care with pain management, Dr. Hoang where she has received her first injection and schedule for additional injections within the next week.  Overall, she states the severity of her neck and lumbar back pain have improved by 80-85%.    In regards to her cervical spine, she reports no significant or overwhelming pain, primarily neck stiffness and intermittent occipital headache.  She denies upper extremity radicular pain, weakness, numbness, or tingling.  She denies a decline in dexterity or frequently dropped items.  Her discomfort worsens with physical activity and decreases to some extent with application of ice, rest, and with use of gabapentin which she obtains through pain management.    In regards to her lumbar back, her discomfort is much improved.  Her primary complaint is intermittent lumbar stiffness.  Since receiving her initial injection she states her right leg pain, numbness, and tingling is resolved.  She denies lower extremity weakness, gait or balance instabilities, need for assist while ambulating, or falls.  She additionally denies fevers, chills, night sweats, unexplained weight loss, saddle anesthesia, or bowel bladder dysfunction.      She currently rates the severity of her overall symptoms 0/10.  Ms. Lujan goes on to state, \"The key to my success has been recognizing my limitations and taking frequent breaks while I'm working\".    Oswestry Disability Index = 26%   Score   Pain Intensity Very " "mild pain - 1   Personal Care Look after myself normally without causing extra pain-0   Lifting Pain prevents me from lifting heavy weights, but medium weights on table-3   Reading Cannot read because of moderate pain-3   Headaches Moderate frequent headaches-3   Concentration Concentrate Fully-0   Work I can do my usual work, but no more-2   Driving I can drive-0   Sleeping No trouble sleeping-0   Recreation All recreational activities with some pain-1     SCORE INTERPRETATION OF THE OSWESTRY NECK DISABILITY QUESTIONNAIRE  10-28: Mild disability   (Merna et al, 1980)    Modified [place] [employer] (mJOA) score  CATEGORY SCORE   Upper extremity Motor Subscore Normal hand coordination-5   Lower Extremity Subscore Normal gait-7   Upper Extremity Sensory Score Normal hand sensation-3   Urinary Function Subscore Normal urination-3   TOTAL = 18/18    The mJOA is an 18 point score of functional disability specific to cervical myelopathy.   15-18: Mild Myelopathy  Oliver et al. (1991). Alecia et al.     The mJOA is an 18 point score of functional disability specific to cervical myelopathy. Oliver et al. (1991).[2]    Oswestry Disability Index = 22%   Score   Pain Intensity Very mild pain-1   Personal Care Look after myself without pain-0   Lifting Medium weights off a table-3   Walking Pain prevents > 1 mile-1   Sitting Sit in \"favorite\" chair as long as I like-1   Standing Stand as long as I like but with extra pain-1   Sleeping Occasionally disturbed-1   Sex Life (if applicable) Sex causes extra pain-2   Social Life Social life normal, but increases pain-1   Traveling Travel gives me extra pain-1   (Shital et al, 1980)    SCORE INTERPRETATION OF THE OSWESTRY LBP DISABILITY QUESTIONNAIRE     20-40% Moderate disability.  This group experiences more pain and problems with sitting, lifting, and standing.  Travel and social life are more difficult and they may well be off work. Personal care, sexual " "activity, and sleeping are not grossly affected, and the back condition can usually be managed by conservative means.      ROS  Review of Systems   Constitutional: Negative.    HENT: Negative.    Eyes: Negative.    Respiratory: Negative.    Cardiovascular: Negative.    Gastrointestinal: Negative.    Endocrine: Negative.    Genitourinary: Negative.    Musculoskeletal: Positive for gait problem and neck stiffness.   Skin: Negative.    Allergic/Immunologic: Negative.    Hematological: Negative.    Psychiatric/Behavioral: Negative.    All other systems reviewed and are negative.    PFSH:  Past Medical History:   Diagnosis Date   • Anxiety    • Arthritis    • Hyperlipidemia      Past Surgical History:   Procedure Laterality Date   • HYSTERECTOMY     • TRIGGER FINGER RELEASE       Objective      Current Outpatient Medications   Medication Sig Dispense Refill   • almotriptan (AXERT) 12.5 MG tablet almotriptan malate 12.5 mg tablet   one daily     • Cholecalciferol (vitamin D3) 125 MCG (5000 UT) tablet tablet Take  by mouth.     • Choline Fenofibrate (Fenofibric Acid) 45 MG capsule delayed-release      • escitalopram (LEXAPRO) 10 MG tablet escitalopram 10 mg tablet   one daily     • estrogens, conjugated, (PREMARIN) 0.3 MG tablet Take 0.3 mg by mouth.     • gabapentin (NEURONTIN) 300 MG capsule Take 300 mg by mouth Every 8 (Eight) Hours.     • hyoscyamine sulfate (ANASPAZ) 0.125 MG tablet dispersible disintegrating tablet 0.125 mg As Needed.     • omeprazole (priLOSEC) 20 MG capsule      • rosuvastatin (CRESTOR) 10 MG tablet rosuvastatin 10 mg tablet   one daily     • valACYclovir (VALTREX) 500 MG tablet Take 500 mg by mouth.     • meloxicam (MOBIC) 15 MG tablet Take 1 tablet by mouth Daily. 30 tablet 0   • tiZANidine (ZANAFLEX) 2 MG tablet Take 2 tablets by mouth At Night As Needed for Muscle Spasms. 30 tablet 0     No current facility-administered medications for this visit.     Vital Signs  Ht 157.5 cm (62\") Comment: pt " reports  Wt 65.8 kg (145 lb)   Breastfeeding No   BMI 26.52 kg/m²   Physical Exam  Vitals and nursing note reviewed.   Constitutional:       General: She is not in acute distress.     Appearance: Normal appearance. She is well-developed, well-groomed and overweight. She is not ill-appearing, toxic-appearing or diaphoretic.      Comments: BMI 26.52   HENT:      Head: Normocephalic and atraumatic.      Right Ear: Hearing normal.      Left Ear: Hearing normal.   Eyes:      Extraocular Movements: EOM normal.      Conjunctiva/sclera: Conjunctivae normal.      Pupils: Pupils are equal, round, and reactive to light.   Neck:      Trachea: Trachea normal.   Cardiovascular:      Rate and Rhythm: Normal rate and regular rhythm.   Pulmonary:      Effort: Pulmonary effort is normal. No tachypnea, bradypnea, accessory muscle usage or respiratory distress.   Abdominal:      Palpations: Abdomen is soft.   Musculoskeletal:      Cervical back: Full passive range of motion without pain and neck supple.   Skin:     General: Skin is warm and dry.   Neurological:      Mental Status: She is alert and oriented to person, place, and time.      GCS: GCS eye subscore is 4. GCS verbal subscore is 5. GCS motor subscore is 6.      Gait: Gait is intact.      Deep Tendon Reflexes:      Reflex Scores:       Tricep reflexes are 3+ on the right side and 3+ on the left side.       Bicep reflexes are 3+ on the right side and 3+ on the left side.       Brachioradialis reflexes are 3+ on the right side and 3+ on the left side.       Patellar reflexes are 3+ on the right side and 3+ on the left side.       Achilles reflexes are 1+ on the right side and 1+ on the left side.  Psychiatric:         Speech: Speech normal.         Behavior: Behavior normal. Behavior is cooperative.       Neurologic Exam     Mental Status   Oriented to person, place, and time.   Attention: normal. Concentration: normal.   Speech: speech is normal   Level of consciousness:  alert    Cranial Nerves     CN II   Visual fields full to confrontation.     CN III, IV, VI   Pupils are equal, round, and reactive to light.  Extraocular motions are normal.     CN V   Facial sensation intact.     CN VII   Facial expression full, symmetric.     CN VIII   CN VIII normal.     CN IX, X   CN IX normal.     CN XI   CN XI normal.     Motor Exam   Right arm tone: normal  Left arm tone: normal  Right leg tone: normal  Left leg tone: normal    Strength   Right deltoid: 5/5  Left deltoid: 5/5  Right biceps: 5/5  Left biceps: 5/5  Right triceps: 5/5  Left triceps: 5/5  Right wrist extension: 5/5  Left wrist extension: 5/5  Right iliopsoas: 5/5  Left iliopsoas: 5/5  Right quadriceps: 5/5  Left quadriceps: 5/5  Right anterior tibial: 5/5  Left anterior tibial: 5/5  Right gastroc: 5/5  Left gastroc: 5/5  Right EHL 5/5  Left EHL 5/5       Sensory Exam   Right arm light touch: normal  Left arm light touch: normal  Right leg light touch: normal  Left leg light touch: normal    Gait, Coordination, and Reflexes     Gait  Gait: normal    Tremor   Resting tremor: absent  Intention tremor: absent  Action tremor: absent    Reflexes   Right brachioradialis: 3+  Left brachioradialis: 3+  Right biceps: 3+  Left biceps: 3+  Right triceps: 3+  Left triceps: 3+  Right patellar: 3+  Left patellar: 3+  Right achilles: 1+  Left achilles: 1+  Right : 4+  Left : 4+  Right Cary: absent  Left Cary: absent  Right ankle clonus: absent  Left ankle clonus: absent  Right pendular knee jerk: absent  Left pendular knee jerk: absent  (12 bullet pts)    Female  strength (pounds)  AGE Right Hand RH Norms Left Hand LH Norms   20-24  70+14.5  61+13.1   25-29  75+13.9  63.5+12   30-34  79+19.2  68+17.7   35-39  74+10.8  66+11.7   40-44  70+13.5  62+13.8   45-49  62+15.1  56+12.7   50-54  66+11.6  57+10.7   55-59  57+12.5  47+11.9   60-64 58, 50,60 55+10.1 45, 38,53 46+10.1   65-69  50+9.7  41+8.2   70-74  50+11.7  42+10.2   75+   43+11.0  38+8.9   (ANCA Saravia et al; Hand Dynometer: Effects of trials and sessions.  Perpetual and Motor Skills 61:195-8, 1985)  * = Dominant hand  > = Intervention    Results Review:  12/7/2020.  MRI of the lumbar spine shows no acute fractures or malalignment, the conus terminates at normal level, no STIR signal change, Modic endplate changes at L5-S1, Schmorl nodes at L1 and L2, multilevel level degenerative changes, right paracentral disc protrusion at T11-T12 without significant central canal or foraminal stenosis, central disc protrusion at T12-L1 resulting in thecal sac compression without central canal or foraminal stenosis, facet arthropathy and ligamentous flavum hypertrophy resulting in bilateral foraminal narrowing at L4-5 and L5-S1.  No significant central canal stenosis noted within the lumbar spine.                              12/21/2020                3/30/2021      3/29/2021      Lab Results   Component Value Date    ANADIRECT Negative 03/04/2021     Lab Results   Component Value Date    APTTREF 25.3 03/04/2021    INR 1.0 03/04/2021    THROMBINTIME 17.2 03/04/2021    DRVVT 31.3 03/04/2021    HEXAGONALP 0 03/04/2021    BILIDIR <9 03/04/2021    BILIDIR <9 03/04/2021    INTERPRETN Comment 03/04/2021     Anticardiolipin IgM   0 - 12 MPL U/mL 70High       Lab Results   Component Value Date    SMITHANTIB <0.2 03/04/2021     Lab Results   Component Value Date     03/04/2021     Lab Results   Component Value Date     03/04/2021     Lab Results   Component Value Date    ENASSAAB <0.2 03/04/2021    ENASSBAB <0.2 03/04/2021         Assessment/Plan: Debbie Lujan is a 60 y.o. female with a significant medical history of tobacco abuse, and she is overweight. She presents today for follow-up of a known complaint of neck pain, as well as mechanical lumbar back pain and intermittent right leg pain in the S1 distribution.  Cervical VINH : 54, 26.  mJOA: 18.  Lumbar VINH: 38, 42, 56, 22.   Physical exam  findings of preserved  strengths, actually improved from her prior exam and above age-matched controls, no focal weakness, unchanged gross hyperreflexia without Cary's or clonus, and a normal gait.  Her imaging prior imaging: X-rays of the cervical spine shows an anterolisthesis of C6 in comparison to C5 with multilevel degenerative changes and MRI shows mild to level cervical stenosis.  MRI of the lumbar spine shows right paracentral disc protrusion at T11-T12 without significant central canal or foraminal stenosis, central disc protrusion at T12-L1 resulting in thecal sac compression without central canal or foraminal stenosis, facet arthropathy and ligamentous flavum hypertrophy resulting in bilateral foraminal narrowing at L4-5 and L5-S1.  No significant central canal stenosis noted within the lumbar spine.  EMG/NCS of the bilateral upper extremities show very mild right median nerve compromise at or near the wrist; EMG/of the bilateral lower extremities were found to be unremarkable.  Rheumatologic lab work showed evidence of an elevated anticardiolipin IgM.    Recommendations:  Cervicalgia  Mild C5/6, 6/7 cervical stenosis  Hyperreflexia  Cervical Spondylitic Myelopathy  DDX: Cervical stenosis, facet arthropathy, brachial plexopathy, peripheral neuropathy     Natural history of cervical spondylitic myelopathy  1. In younger patients with mild CSM (age younger than 75 years and mJOA scale score > 12), both operative and nonoperative management options be offered, as objectively measurable deterioration in function is rarely seen acutely (quality of evidence: Class I; strength of recommendation, B).   2. Also the clinical gains after nonoperative treatment are often maintained over 3 years in 70% of cases (quality of evidence, Class III; strength of recommendation, D).   3. The course of CSM is mixed, with many patients experiencing a slow, stepwise decline. We addressed the fact that long periods of  quiescence are not uncommon and that a subgroup of patients may have interim improvement (quality of evidence, Class III; strength of recommendation, D).   4. However, long periods of severe stenosis over many years are associated with demyelination of white matter and may result in necrosis of both gray and white matter leading to potentially irreversible deficit (quality of evidence Class III; strength of recommendation, D).      Expected Functional Outcome After Surgery  1. Debbie is 60 y.o. and her symptoms have been present on years.  Debbie's preoperative  [place] [employer] Questionnaire (mJOA) is 12/18 and Oswestry Neck Disability Index (NDI) is 54.  These validated functional outcome measure will be trended postoperatively to better quantify her recovery (quality of evidence, Class II; strength of recommendation, B).   2. Recovery varies  a. The mean mJOA scores improve from 10.5 to 14.1  (Marleen et al., 1993)  b. 59% of patients recovering significant neuro function.  (Marleen et al., 1993) (Charissa et al., 2002)  3. Duration of symptoms affects recovery rate  a. Symptom duration correlates with neurological recovery rate. (aMrleen et al., 1993)  b. 73% of patients symptoms for <2 yrs improved postop outcomes, whereas 53% with symptoms >2 yrs had improvement. (Chagas et al., 2005)  c. In elderly patients, duration of symptoms (<1 yr) was predictive of an excellent neurological recovery. (Rossi et al., 2003)  d. Among elderly patients both symptom duration and severity of canal stenosis affected the neurological outcome. (Charissa et al., 2002)  4. Age affects recovery rate  a. 70% of patients <60 yrs old had an improved outcome score, whereas 56% of patients >60 yrs had an improved outcome score. (Chagas et al., 2005)  b. Some studies show limited effect of age ... (Yamazaki et al., 2003)  (Marleen et al., 1993)  5. Symptom severity affects recovery  a. Among younger patients only symptom severity  affected recovery. (Charissa et al., 2002)  6. The prognostic value of clinical factors such as age, duration of symptoms, and preoperative neurological function results were discussed with Debbie (quality of evidence, Class III; strength of recommendation, D)     Radiographs  1. Preoperative T2 hyperintensity at multiple levels in the cervical cord predicts poor surgical outcome (quality of evidence, Class III; strength of recommendation, D).   2. Preoperative T1 hypointensity combined with T2 hyperintensity at the any single level predicts a poor surgical outcome (quality of evidence, Class III; strength of recommendation, D).   3. Spinal cord atrophy (transverse area < 45 mm2) may predict worse outcome (quality of evidence, Class III; strength of recommendation, D).      Surgical decision Making  1. In patients with cervical stenosis without myelopathy who have abnormal EMG findings, decompression should be considered because the presence of EMG abnormalities or clinical radiculopathy is associated with development of symptomatic CSM (quality of evidence, Class I; strength of recommendation, B).  2. MILD CSM (mJOA scale score >12) be treated in the short term (3 years) either with surgical decompression or with nonoperative therapy (prolonged immobilization in a stiff cervical collar, “low-risk” activity modification or bed rest, and antiinflammatory medications) based on patient preference (quality of evidence, Class II; strength of recommendation, C).     Ms. Lujan has done extremely well since we last saw her.  She currently complains of mild intermittent neck stiffness and denies upper extremity radicular pain, weakness, numbness, or tingling.  Per Dr. Valdovinos's previous note, her cervical stenosis are relatively mild.    I recommended she continue conservative management.  No surgical intervention warranted at this time.      Right Carpal Tunnel Syndrome   Differential diagnosis: Carpal tunnel syndrome,  diabetic neuropathy, thoracic outlet syndrome, complex regional pain syndrome, cervical stenosis with radiculopathy.     Ms. Lujan currently denies right upper extremity radicular pain, numbness, or tingling.  Her bilateral  strengths have actually increased since she was last evaluated and are currently above age-matched controls.  Her EMG and nerve conduction study however confirmed very mild entrapment at at the right wrist.  We discussed conservative management for carpal tunnel to include, but not limited to rest and bracing.  Ms. Lujan declines this recommendation at this time, and further states she is currently not interested in pursuing any surgical interventions.    Axial Lumbar Pain with bilateral leg pain in a nondermatomal distribution   L5/S1 Lumbar disc disease without stenosis   In regards to her lumbar spine, Ms. Lujan states her overall back pain is much improved.  Her primary complaint is intermittent lumbar stiffness that worsens with physical activity.  Since receiving her initial injection with Dr. Hoang her right leg pain, numbness, and tingling has resolved.  Her VINH is decreased from 56-22.  Per Dr. Valdovinos's previous note, her imaging shows no significant central stenosis and only mild foraminal stenosis at L5/S1.  She does have some advanced disease at L5/S1.    However, her EMG and nerve conduction study of the bilateral lower extremities was found to be 100.  In conjunction with Dr. Valdovinos's previous note, I recommended she continue conservative management.    TREATMENT RECOMMENDATIONS ...  PT/OT, completed  Chiropractic as tolerated  Nonsteroidal anti-inflammatories,   Currently enrolled in pain management on gabapentin and awaiting injections  Inflammatory workup: HLA-B27, Rheumatoid Factor, CCP, LEONEL, anti-ds DNA, Anti-[place], antiphospholipid antibodies, Anti-SSA, anti-SSB.  Myopathy evaluation: CK, aldolase, SGOT, SGPT, LDH and consider EMG and biopsy.      Elevated  anticardiolipin IgM  Anticardiolipin IgM   0 - 12 MPL U/mL 70High       For further evaluation we will refer Ms. Lujan to rheumatology, Dr. Lobo.    Overweight: 25.0-29.9kg/m2   Body mass index is 26.52 kg/m².  Information on the DASH diet provided in the AVS.  We will continue to provided diet and exercise information with the goal of weight loss at each scheduled appointment    Tobacco abuse  Ms. Lujan is currently using both the Nicorette patch and gum.  She states she is not smoking.  I recommended she continue this plan of care.      Diagnoses and all orders for this visit:    1. Cervicalgia (Primary)    2. Degeneration of cervical intervertebral disc    3. Hyperreflexia    4. Numbness in both hands  Comments:  Currently resolved    5. Right carpal tunnel syndrome    6. Lumbar back pain    7. Degeneration of lumbar intervertebral disc    8. Pain of right lower extremity  Comments:  Currently resolved    9. Anticardiolipin antibody positive  -     Ambulatory Referral to Rheumatology    10. Overweight (BMI 25.0-29.9)    11. Cigarette smoker      Return if symptoms worsen or fail to improve.    Level of Risk: Moderate due to: two stable chronic illnesses  MDM: Moderate  (Mod = 54750, High = 93730)    Thank you, for allowing me to continue to participate in the care of this patient.    Sincerely,  JIM Franco

## 2021-06-01 ENCOUNTER — TELEPHONE (OUTPATIENT)
Dept: ONCOLOGY | Facility: CLINIC | Age: 61
End: 2021-06-01

## 2021-06-01 NOTE — TELEPHONE ENCOUNTER
Caller: PT    Relationship: PT    Best call back number: [phone]    What is the best time to reach you:     Who are you requesting to speak with (clinical staff, provider,  specific staff member): SCHED    Do you know the name of the person who called:     What was the call regarding: PT TO RESCHED MISSED APPT  DUE TO FAMILY DEATH    Do you require a callback: YES    IF CALLING BACK  WILL NOT BE ABLE TO ANSWER DUE TO  ARRANGEMENTS

## 2021-06-16 ENCOUNTER — LAB (OUTPATIENT)
Dept: LAB | Facility: HOSPITAL | Age: 61
End: 2021-06-16

## 2021-06-16 ENCOUNTER — CONSULT (OUTPATIENT)
Dept: ONCOLOGY | Facility: CLINIC | Age: 61
End: 2021-06-16

## 2021-06-16 VITALS
TEMPERATURE: 97.5 F | BODY MASS INDEX: 27.64 KG/M2 | OXYGEN SATURATION: 96 % | HEIGHT: 62 IN | SYSTOLIC BLOOD PRESSURE: 122 MMHG | HEART RATE: 80 BPM | RESPIRATION RATE: 16 BRPM | WEIGHT: 150.2 LBS | DIASTOLIC BLOOD PRESSURE: 78 MMHG

## 2021-06-16 DIAGNOSIS — D64.9 ANEMIA, UNSPECIFIED TYPE: Primary | ICD-10-CM

## 2021-06-16 DIAGNOSIS — D64.9 ANEMIA, UNSPECIFIED TYPE: ICD-10-CM

## 2021-06-16 DIAGNOSIS — D75.1 ERYTHROCYTOSIS: ICD-10-CM

## 2021-06-16 DIAGNOSIS — M35.9 AUTOIMMUNE DISEASE (HCC): ICD-10-CM

## 2021-06-16 DIAGNOSIS — M54.9 BACK PAIN, UNSPECIFIED BACK LOCATION, UNSPECIFIED BACK PAIN LATERALITY, UNSPECIFIED CHRONICITY: ICD-10-CM

## 2021-06-16 DIAGNOSIS — M71.9 BURSITIS, UNSPECIFIED SITE: ICD-10-CM

## 2021-06-16 LAB
ALBUMIN SERPL-MCNC: 4.2 G/DL (ref 3.5–5.2)
ALBUMIN/GLOB SERPL: 1.4 G/DL
ALP SERPL-CCNC: 81 U/L (ref 39–117)
ALT SERPL W P-5'-P-CCNC: 11 U/L (ref 1–33)
ANION GAP SERPL CALCULATED.3IONS-SCNC: 11 MMOL/L (ref 5–15)
AST SERPL-CCNC: 13 U/L (ref 1–32)
BASOPHILS # BLD AUTO: 0.04 10*3/MM3 (ref 0–0.2)
BASOPHILS NFR BLD AUTO: 0.4 % (ref 0–1.5)
BILIRUB SERPL-MCNC: 0.2 MG/DL (ref 0–1.2)
BUN SERPL-MCNC: 16 MG/DL (ref 8–23)
BUN/CREAT SERPL: 20 (ref 7–25)
CALCIUM SPEC-SCNC: 9.8 MG/DL (ref 8.6–10.5)
CHLORIDE SERPL-SCNC: 106 MMOL/L (ref 98–107)
CO2 SERPL-SCNC: 25 MMOL/L (ref 22–29)
CREAT SERPL-MCNC: 0.8 MG/DL (ref 0.57–1)
DEPRECATED RDW RBC AUTO: 48 FL (ref 37–54)
EOSINOPHIL # BLD AUTO: 0.34 10*3/MM3 (ref 0–0.4)
EOSINOPHIL NFR BLD AUTO: 3.6 % (ref 0.3–6.2)
ERYTHROCYTE [DISTWIDTH] IN BLOOD BY AUTOMATED COUNT: 13.2 % (ref 12.3–15.4)
ERYTHROCYTE [SEDIMENTATION RATE] IN BLOOD: 4 MM/HR (ref 0–20)
FERRITIN SERPL-MCNC: 127.6 NG/ML (ref 13–150)
GFR SERPL CREATININE-BSD FRML MDRD: 73 ML/MIN/1.73
GLOBULIN UR ELPH-MCNC: 2.9 GM/DL
GLUCOSE SERPL-MCNC: 119 MG/DL (ref 65–99)
HCT VFR BLD AUTO: 42.5 % (ref 34–46.6)
HGB BLD-MCNC: 14.3 G/DL (ref 12–15.9)
IMM GRANULOCYTES # BLD AUTO: 0.04 10*3/MM3 (ref 0–0.05)
IMM GRANULOCYTES NFR BLD AUTO: 0.4 % (ref 0–0.5)
IRON 24H UR-MRATE: 79 MCG/DL (ref 37–145)
IRON SATN MFR SERPL: 19 % (ref 20–50)
LYMPHOCYTES # BLD AUTO: 2.66 10*3/MM3 (ref 0.7–3.1)
LYMPHOCYTES NFR BLD AUTO: 28.2 % (ref 19.6–45.3)
MCH RBC QN AUTO: 33.3 PG (ref 26.6–33)
MCHC RBC AUTO-ENTMCNC: 33.6 G/DL (ref 31.5–35.7)
MCV RBC AUTO: 98.8 FL (ref 79–97)
MONOCYTES # BLD AUTO: 0.64 10*3/MM3 (ref 0.1–0.9)
MONOCYTES NFR BLD AUTO: 6.8 % (ref 5–12)
NEUTROPHILS NFR BLD AUTO: 5.72 10*3/MM3 (ref 1.7–7)
NEUTROPHILS NFR BLD AUTO: 60.6 % (ref 42.7–76)
NRBC BLD AUTO-RTO: 0 /100 WBC (ref 0–0.2)
PLATELET # BLD AUTO: 268 10*3/MM3 (ref 140–450)
PMV BLD AUTO: 10.8 FL (ref 6–12)
POTASSIUM SERPL-SCNC: 4 MMOL/L (ref 3.5–5.2)
PROT SERPL-MCNC: 7.1 G/DL (ref 6–8.5)
RBC # BLD AUTO: 4.3 10*6/MM3 (ref 3.77–5.28)
SODIUM SERPL-SCNC: 142 MMOL/L (ref 136–145)
TIBC SERPL-MCNC: 422 MCG/DL (ref 298–536)
TRANSFERRIN SERPL-MCNC: 283 MG/DL (ref 200–360)
WBC # BLD AUTO: 9.44 10*3/MM3 (ref 3.4–10.8)

## 2021-06-16 PROCEDURE — 99204 OFFICE O/P NEW MOD 45 MIN: CPT | Performed by: INTERNAL MEDICINE

## 2021-06-16 PROCEDURE — 86147 CARDIOLIPIN ANTIBODY EA IG: CPT

## 2021-06-16 PROCEDURE — 85025 COMPLETE CBC W/AUTO DIFF WBC: CPT

## 2021-06-16 PROCEDURE — 85651 RBC SED RATE NONAUTOMATED: CPT

## 2021-06-16 PROCEDURE — 82232 ASSAY OF BETA-2 PROTEIN: CPT | Performed by: INTERNAL MEDICINE

## 2021-06-16 PROCEDURE — 82746 ASSAY OF FOLIC ACID SERUM: CPT

## 2021-06-16 PROCEDURE — 84466 ASSAY OF TRANSFERRIN: CPT

## 2021-06-16 PROCEDURE — 36415 COLL VENOUS BLD VENIPUNCTURE: CPT

## 2021-06-16 PROCEDURE — 80053 COMPREHEN METABOLIC PANEL: CPT

## 2021-06-16 PROCEDURE — 83540 ASSAY OF IRON: CPT

## 2021-06-16 PROCEDURE — 82607 VITAMIN B-12: CPT

## 2021-06-16 PROCEDURE — 82728 ASSAY OF FERRITIN: CPT

## 2021-06-16 RX ORDER — HYDROCODONE BITARTRATE AND ACETAMINOPHEN 5; 325 MG/1; MG/1
1 TABLET ORAL EVERY 6 HOURS PRN
COMMUNITY

## 2021-06-16 RX ORDER — IBUPROFEN AND FAMOTIDINE 800; 26.6 MG/1; MG/1
TABLET, COATED ORAL
COMMUNITY

## 2021-06-16 NOTE — PROGRESS NOTES
[facility] HEMATOLOGY AND ONCOLOGY  [address]  [facility] [address] [phone]    Chief Complaint  Anticardiolipin Antibody (Initial consullt)    Subjective        She is here for positive result for anticardiolipin antibody. She was seen by OS team for possible back surgery. She has a long time problem with back pain numbness arm tenderness with possible bursitis. She has been tested for DERICK and recently has been tested for multiple autoimmune DS markers. Of note she had HX of high HGB and had donated blood while no official testing was performed for underlying causes    Oncology/Hematology History    No history exists.         PAST MEDICAL HISTORY:  ALLERGIES:  Allergies   Allergen Reactions   • Bactrim [Sulfamethoxazole-Trimethoprim] Shortness Of Breath     CURRENT MEDICATIONS:  Outpatient Encounter Medications as of 6/16/2021   Medication Sig Dispense Refill   • almotriptan (AXERT) 12.5 MG tablet almotriptan malate 12.5 mg tablet   one daily     • Cholecalciferol (vitamin D3) 125 MCG (5000 UT) tablet tablet Take  by mouth.     • Choline Fenofibrate (Fenofibric Acid) 45 MG capsule delayed-release      • escitalopram (LEXAPRO) 10 MG tablet escitalopram 10 mg tablet   one daily     • estrogens, conjugated, (PREMARIN) 0.3 MG tablet Take 0.3 mg by mouth.     • gabapentin (NEURONTIN) 300 MG capsule Take 300 mg by mouth Every 8 (Eight) Hours.     • HYDROcodone-acetaminophen (NORCO) 5-325 MG per tablet Take 1 tablet by mouth Every 6 (Six) Hours As Needed.     • hyoscyamine sulfate (ANASPAZ) 0.125 MG tablet dispersible disintegrating tablet 0.125 mg As Needed.     • Ibuprofen-Famotidine (Duexis) 800-26.6 MG tablet Take  by mouth.     • omeprazole (priLOSEC) 20 MG capsule      • rosuvastatin (CRESTOR) 10 MG tablet rosuvastatin 10 mg tablet   one daily     • valACYclovir (VALTREX) 500 MG tablet Take 500 mg by mouth.     • meloxicam (MOBIC) 15 MG  tablet Take 1 tablet by mouth Daily. 30 tablet 0   • tiZANidine (ZANAFLEX) 2 MG tablet Take 2 tablets by mouth At Night As Needed for Muscle Spasms. 30 tablet 0     No facility-administered encounter medications on file as of 6/16/2021.     ADULT ILLNESSES:  Patient Active Problem List   Diagnosis Code   • Chest pressure R07.89   • Gastroesophageal reflux disease K21.9   • Right upper quadrant abdominal pain R10.11   • Back pain M54.9   • Cigarette smoker F17.210   • Obesity due to excess calories E66.09   • Degeneration of lumbar intervertebral disc M51.36   • Degeneration of cervical intervertebral disc M50.30   • Numbness in both hands R20.0   • Numbness in both legs R20.0   • Overweight (BMI 25.0-29.9) E66.3   • Anemia D64.9   • Autoimmune disease (CMS/HCC) M35.9   • Bursitis M71.9   • Erythrocytosis D75.1     SURGERIES:  Past Surgical History:   Procedure Laterality Date   • HYSTERECTOMY     • TRIGGER FINGER RELEASE       HEALTH MAINTENANCE ITEMS:  Health Maintenance Due   Topic Date Due   • ANNUAL PHYSICAL  Never done   • Pneumococcal Vaccine 0-64 (1 of 1 - PPSV23) Never done   • TDAP/TD VACCINES (1 - Tdap) Never done   • ZOSTER VACCINE (1 of 2) Never done   • HEPATITIS C SCREENING  Never done   • LIPID PANEL  Never done   • COVID-19 Vaccine (2 - Moderna 2-dose series) 04/15/2021       <no information>  Last Completed Colonoscopy     This patient has no relevant Health Maintenance data.          There is no immunization history on file for this patient.  Last Completed Mammogram          MAMMOGRAM (Every 2 Years) Next due on 10/19/2022    10/19/2020  Outside Procedure: HC MAMMOGRAM SCREENING BILAT DIGITAL W CAD                  FAMILY HISTORY:  History reviewed. No pertinent family history.  SOCIAL HISTORY:  Social History     Socioeconomic History   • Marital status:      Spouse name: Not on file   • Number of children: Not on file   • Years of education: Not on file   • Highest education level: Not  "on file   Tobacco Use   • Smoking status: Current Every Day Smoker     Packs/day: 0.50   • Smokeless tobacco: Never Used   Vaping Use   • Vaping Use: Never used   Substance and Sexual Activity   • Alcohol use: Never   • Drug use: Never   • Sexual activity: Defer       REVIEW OF SYSTEMS:  Review of Systems   Constitutional: Positive for fatigue.   HENT: Negative.    Cardiovascular: Positive for leg swelling.   Endocrine: Negative.    Musculoskeletal: Positive for gait problem, joint swelling, myalgias and bursitis.   Allergic/Immunologic: Negative.    Neurological: Positive for dizziness, weakness and numbness.   Hematological: Bruises/bleeds easily.   Psychiatric/Behavioral: Positive for stress.       /78   Pulse 80   Temp 97.5 °F (36.4 °C) (Temporal)   Resp 16   Ht 157.5 cm (62\")   Wt 68.1 kg (150 lb 3.2 oz)   SpO2 96%   Breastfeeding No   BMI 27.47 kg/m²  Body surface area is 1.69 meters squared.  Pain Score    06/16/21 1449   PainSc: 0-No pain       Objective   Vital Signs:   /78   Pulse 80   Temp 97.5 °F (36.4 °C) (Temporal)   Resp 16   Ht 157.5 cm (62\")   Wt 68.1 kg (150 lb 3.2 oz)   SpO2 96%   Breastfeeding No   BMI 27.47 kg/m²  Body surface area is 1.69 meters squared.  Pain Score    06/16/21 1449   PainSc: 0-No pain     Debbie Lujan reports a pain score of 0.  Given her pain assessment as noted, treatment options were discussed and the following options were decided upon as a follow-up plan to address the patient's pain: .      Patient's Body mass index is 27.47 kg/m². indicating that she is .      Physical Exam  Constitutional:       General: She is in acute distress.      Appearance: Normal appearance.   HENT:      Head: Atraumatic.      Nose: Nose normal.      Mouth/Throat:      Mouth: Mucous membranes are dry.   Eyes:      Extraocular Movements: Extraocular movements intact.   Cardiovascular:      Rate and Rhythm: Normal rate.   Pulmonary:      Effort: Pulmonary effort is " normal.   Abdominal:      Palpations: Abdomen is soft.   Musculoskeletal:      Cervical back: Normal range of motion.   Skin:     General: Skin is warm and dry.   Neurological:      General: No focal deficit present.      Mental Status: She is oriented to person, place, and time.   Psychiatric:         Mood and Affect: Mood normal.         Behavior: Behavior normal.            Result Review :     LABS    Lab Results - Last 18 Months   Lab Units 06/16/21  1542   HEMOGLOBIN g/dL 14.3   HEMATOCRIT % 42.5   MCV fL 98.8*   WBC 10*3/mm3 9.44   RDW % 13.2   MPV fL 10.8   PLATELETS 10*3/mm3 268   IMM GRAN % % 0.4   NEUTROS ABS 10*3/mm3 5.72   LYMPHS ABS 10*3/mm3 2.66   MONOS ABS 10*3/mm3 0.64   EOS ABS 10*3/mm3 0.34   BASOS ABS 10*3/mm3 0.04   IMMATURE GRANS (ABS) 10*3/mm3 0.04   NRBC /100 WBC 0.0       Lab Results - Last 18 Months   Lab Units 06/16/21  1542 03/04/21  1603   GLUCOSE mg/dL 119*  --    SODIUM mmol/L 142  --    POTASSIUM mmol/L 4.0  --    CO2 mmol/L 25.0  --    CHLORIDE mmol/L 106  --    ANION GAP mmol/L 11.0  --    CREATININE mg/dL 0.80  --    BUN mg/dL 16  --    BUN / CREAT RATIO  20.0  --    CALCIUM mg/dL 9.8  --    EGFR IF NONAFRICN AM mL/min/1.73 73  --    ALK PHOS U/L 81  --    TOTAL PROTEIN g/dL 7.1  --    ALT (SGPT) U/L 11 9   AST (SGOT) U/L 13 14   BILIRUBIN mg/dL 0.2  --    ALBUMIN g/dL 4.20  --    GLOBULIN gm/dL 2.9  --        Lab Results - Last 18 Months   Lab Units 03/04/21  1603   LDH U/L 192       Lab Results - Last 18 Months   Lab Units 06/16/21  1542   IRON mcg/dL 79   TIBC mcg/dL 422   IRON SATURATION % 19*   FERRITIN ng/mL 127.60              Assessment and Plan    Problem List Items Addressed This Visit        Other    Back pain    Current Assessment & Plan     1. We will see if she has possible finding of MGUS or elevated immunoglobulin         Anemia - Primary    Current Assessment & Plan     1. It is not clear if she had anemia  - We will run some base line test         Relevant Orders     CBC & Differential (Completed)    Comprehensive Metabolic Panel (Completed)    Ferritin (Completed)    Iron Profile (Completed)    Vitamin B12    Folate    Sedimentation Rate (Completed)    Beta 2 Microglobulin, Serum    Anticardiolipin Antibody, IgG / M, Qn    Autoimmune disease (CMS/HCC)    Current Assessment & Plan     1. She has strong family HX of RA  2. She has possible findings of early stage RA  3. For most other than anticardiolipin antibody all of test are negative  4. We will repeat anti cardiolipin antibody test         Relevant Medications    Ibuprofen-Famotidine (Duexis) 800-26.6 MG tablet    Bursitis    Current Assessment & Plan     1. She has potential finding for this  2. I have asked her to discuss this with her OS MD         Erythrocytosis    Current Assessment & Plan     1. 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 We will discuss about this in her return visit             In summary  1. Her visit seems to be related with positive anti cardiolipin antibody  2. However real issue seems to be more related with rheumatological processes  3. I have discussed about some of the base line test  4. She has HX of erythrocytosis and MPD, P Vera is suspected    Follow Up   Patient was given instructions and counseling regarding her condition or for health maintenance advice. Please see specific information pulled into the AVS if appropriate.

## 2021-06-17 LAB
B2 MICROGLOB SERPL-MCNC: 1.9 MG/L (ref 0.8–2.2)
CARDIOLIPIN IGG SER IA-ACNC: <9 GPL U/ML (ref 0–14)
CARDIOLIPIN IGM SER IA-ACNC: 58 MPL U/ML (ref 0–12)
FOLATE SERPL-MCNC: 7.39 NG/ML (ref 4.78–24.2)
VIT B12 BLD-MCNC: 403 PG/ML (ref 211–946)

## 2021-06-17 NOTE — ASSESSMENT & PLAN NOTE
1. 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 We will discuss about this in her return visit

## 2021-06-17 NOTE — ASSESSMENT & PLAN NOTE
1. She has strong family HX of RA  2. She has possible findings of early stage RA  3. For most other than anticardiolipin antibody all of test are negative  4. We will repeat anti cardiolipin antibody test

## 2021-07-01 ENCOUNTER — OFFICE VISIT (OUTPATIENT)
Dept: ONCOLOGY | Facility: CLINIC | Age: 61
End: 2021-07-01

## 2021-07-01 VITALS
BODY MASS INDEX: 27.27 KG/M2 | DIASTOLIC BLOOD PRESSURE: 88 MMHG | OXYGEN SATURATION: 95 % | RESPIRATION RATE: 16 BRPM | WEIGHT: 148.2 LBS | HEART RATE: 77 BPM | SYSTOLIC BLOOD PRESSURE: 160 MMHG | TEMPERATURE: 98 F | HEIGHT: 62 IN

## 2021-07-01 DIAGNOSIS — D50.0 IRON DEFICIENCY ANEMIA DUE TO CHRONIC BLOOD LOSS: ICD-10-CM

## 2021-07-01 DIAGNOSIS — M35.9 AUTOIMMUNE DISEASE (HCC): Primary | ICD-10-CM

## 2021-07-01 PROCEDURE — 99213 OFFICE O/P EST LOW 20 MIN: CPT | Performed by: INTERNAL MEDICINE

## 2021-07-01 NOTE — PROGRESS NOTES
[facility] GROUP HEMATOLOGY AND ONCOLOGY  [address]  [place] [facility] [address] [phone]    Chief Complaint  Anemia (follow up)    She is here for positive result for anticardiolipin antibody. She was seen by OS team for possible back surgery. She has a long time problem with back pain numbness arm tenderness with possible bursitis. She has been tested for DERICK and recently has been tested for multiple autoimmune DS markers. Of note she had HX of high HGB and had donated blood while no official testing was performed for underlying causes    Today she visit as a FU after some of the new tests       Oncology/Hematology History    No history exists.         PAST MEDICAL HISTORY:  ALLERGIES:  Allergies   Allergen Reactions   • Bactrim [Sulfamethoxazole-Trimethoprim] Shortness Of Breath     CURRENT MEDICATIONS:  Outpatient Encounter Medications as of 7/1/2021   Medication Sig Dispense Refill   • almotriptan (AXERT) 12.5 MG tablet almotriptan malate 12.5 mg tablet   one daily     • Cholecalciferol (vitamin D3) 125 MCG (5000 UT) tablet tablet Take  by mouth.     • Choline Fenofibrate (Fenofibric Acid) 45 MG capsule delayed-release      • escitalopram (LEXAPRO) 10 MG tablet escitalopram 10 mg tablet   one daily     • estrogens, conjugated, (PREMARIN) 0.3 MG tablet Take 0.3 mg by mouth.     • gabapentin (NEURONTIN) 300 MG capsule Take 300 mg by mouth Every 8 (Eight) Hours.     • HYDROcodone-acetaminophen (NORCO) 5-325 MG per tablet Take 1 tablet by mouth Every 6 (Six) Hours As Needed.     • hyoscyamine sulfate (ANASPAZ) 0.125 MG tablet dispersible disintegrating tablet 0.125 mg As Needed.     • Ibuprofen-Famotidine (Duexis) 800-26.6 MG tablet Take  by mouth.     • omeprazole (priLOSEC) 20 MG capsule      • rosuvastatin (CRESTOR) 10 MG tablet rosuvastatin 10 mg tablet   one daily     • valACYclovir (VALTREX) 500 MG tablet Take 500 mg by mouth.     • meloxicam  (MOBIC) 15 MG tablet Take 1 tablet by mouth Daily. 30 tablet 0   • tiZANidine (ZANAFLEX) 2 MG tablet Take 2 tablets by mouth At Night As Needed for Muscle Spasms. 30 tablet 0     No facility-administered encounter medications on file as of 7/1/2021.     ADULT ILLNESSES:  Patient Active Problem List   Diagnosis Code   • Chest pressure R07.89   • Gastroesophageal reflux disease K21.9   • Right upper quadrant abdominal pain R10.11   • Back pain M54.9   • Cigarette smoker F17.210   • Obesity due to excess calories E66.09   • Degeneration of lumbar intervertebral disc M51.36   • Degeneration of cervical intervertebral disc M50.30   • Numbness in both hands R20.0   • Numbness in both legs R20.0   • Overweight (BMI 25.0-29.9) E66.3   • Anemia D64.9   • Autoimmune disease (CMS/HCC) M35.9   • Bursitis M71.9   • Erythrocytosis D75.1     SURGERIES:  Past Surgical History:   Procedure Laterality Date   • HYSTERECTOMY     • TRIGGER FINGER RELEASE       HEALTH MAINTENANCE ITEMS:  Health Maintenance Due   Topic Date Due   • ANNUAL PHYSICAL  Never done   • Pneumococcal Vaccine 0-64 (1 of 1 - PPSV23) Never done   • TDAP/TD VACCINES (1 - Tdap) Never done   • ZOSTER VACCINE (1 of 2) Never done   • HEPATITIS C SCREENING  Never done   • LIPID PANEL  Never done   • COVID-19 Vaccine (2 - Moderna 2-dose series) 04/15/2021       <no information>  Last Completed Colonoscopy     This patient has no relevant Health Maintenance data.          There is no immunization history on file for this patient.  Last Completed Mammogram          MAMMOGRAM (Every 2 Years) Next due on 10/19/2022    10/19/2020  Outside Procedure: HC MAMMOGRAM SCREENING BILAT DIGITAL W CAD                  FAMILY HISTORY:  History reviewed. No pertinent family history.  SOCIAL HISTORY:  Social History     Socioeconomic History   • Marital status:      Spouse name: Not on file   • Number of children: Not on file   • Years of education: Not on file   • Highest education  "level: Not on file   Tobacco Use   • Smoking status: Current Every Day Smoker     Packs/day: 0.50   • Smokeless tobacco: Never Used   Vaping Use   • Vaping Use: Never used   Substance and Sexual Activity   • Alcohol use: Never   • Drug use: Never   • Sexual activity: Defer       REVIEW OF SYSTEMS:  Review of Systems   Constitutional: Negative.    HENT: Negative.    Eyes: Negative.    Respiratory: Negative.    Cardiovascular: Negative.    Gastrointestinal: Negative.    Endocrine: Negative.    Genitourinary: Negative.    Musculoskeletal: Positive for back pain.   Allergic/Immunologic: Negative.    Neurological: Negative.    Hematological: Negative.        /88   Pulse 77   Temp 98 °F (36.7 °C)   Resp 16   Ht 157.5 cm (62\")   Wt 67.2 kg (148 lb 3.2 oz)   SpO2 95%   Breastfeeding No   BMI 27.11 kg/m²  Body surface area is 1.68 meters squared.  Pain Score    07/01/21 1534   PainSc: 0-No pain       Objective   Vital Signs:   /88   Pulse 77   Temp 98 °F (36.7 °C)   Resp 16   Ht 157.5 cm (62\")   Wt 67.2 kg (148 lb 3.2 oz)   SpO2 95%   Breastfeeding No   BMI 27.11 kg/m²  Body surface area is 1.68 meters squared.  Pain Score    07/01/21 1534   PainSc: 0-No pain     Debbie Lujan reports a pain score of 0.  Given her pain assessment as noted, treatment options were discussed and the following options were decided upon as a follow-up plan to address the patient's pain:       Patient's Body mass index is 27.11 kg/m². indicating that she is .      Physical Exam       Result Review :    LABS    Lab Results - Last 18 Months   Lab Units 06/16/21  1542   HEMOGLOBIN g/dL 14.3   HEMATOCRIT % 42.5   MCV fL 98.8*   WBC 10*3/mm3 9.44   RDW % 13.2   MPV fL 10.8   PLATELETS 10*3/mm3 268   IMM GRAN % % 0.4   NEUTROS ABS 10*3/mm3 5.72   LYMPHS ABS 10*3/mm3 2.66   MONOS ABS 10*3/mm3 0.64   EOS ABS 10*3/mm3 0.34   BASOS ABS 10*3/mm3 0.04   IMMATURE GRANS (ABS) 10*3/mm3 0.04   NRBC /100 WBC 0.0       Lab Results - Last " 18 Months   Lab Units 06/16/21  1542 03/04/21  1603   GLUCOSE mg/dL 119*  --    SODIUM mmol/L 142  --    POTASSIUM mmol/L 4.0  --    CO2 mmol/L 25.0  --    CHLORIDE mmol/L 106  --    ANION GAP mmol/L 11.0  --    CREATININE mg/dL 0.80  --    BUN mg/dL 16  --    BUN / CREAT RATIO  20.0  --    CALCIUM mg/dL 9.8  --    EGFR IF NONAFRICN AM mL/min/1.73 73  --    ALK PHOS U/L 81  --    TOTAL PROTEIN g/dL 7.1  --    ALT (SGPT) U/L 11 9   AST (SGOT) U/L 13 14   BILIRUBIN mg/dL 0.2  --    ALBUMIN g/dL 4.20  --    GLOBULIN gm/dL 2.9  --        Lab Results - Last 18 Months   Lab Units 03/04/21  1603   LDH U/L 192       Lab Results - Last 18 Months   Lab Units 06/16/21  1542   IRON mcg/dL 79   TIBC mcg/dL 422   IRON SATURATION % 19*   FERRITIN ng/mL 127.60   FOLATE ng/mL 7.39              Assessment and Plan    Problem List Items Addressed This Visit        Other    Anemia    Current Assessment & Plan     1. She is not anemic         Autoimmune disease (CMS/HCC) - Primary    Current Assessment & Plan     1. Her repeat anti cardiolipin tests level was high  2. But she does not have any blood clot HX             In summary  1. Her visit seems to be related with positive anti cardiolipin antibody  2. However real issue seems to be more related with rheumatological processes  3. I have discussed about some of the base line test  4. She understands the visit for initial consult and she realized that she does not carry any particular hematological issues     Follow Up     Patient was given instructions and counseling regarding her condition or for health maintenance advice. Please see specific information pulled into the AVS if appropriate.

## 2021-07-30 ENCOUNTER — TELEPHONE (OUTPATIENT)
Dept: OBGYN CLINIC | Age: 61
End: 2021-07-30

## 2021-07-30 DIAGNOSIS — Z79.890 HORMONE REPLACEMENT THERAPY (HRT): Primary | ICD-10-CM

## 2021-07-30 DIAGNOSIS — B00.9 HSV INFECTION: ICD-10-CM

## 2021-07-30 RX ORDER — VALACYCLOVIR HYDROCHLORIDE 500 MG/1
500 TABLET, FILM COATED ORAL DAILY
Qty: 90 TABLET | Refills: 3 | Status: SHIPPED | OUTPATIENT
Start: 2021-07-30 | End: 2021-10-26 | Stop reason: SDUPTHER

## 2021-07-30 NOTE — TELEPHONE ENCOUNTER
Called and informed pt I sent refills of Premarin and Valtrex to her [facility] ([facility]). Pt v/u.

## 2021-09-28 ENCOUNTER — OFFICE VISIT (OUTPATIENT)
Dept: GASTROENTEROLOGY | Facility: CLINIC | Age: 61
End: 2021-09-28

## 2021-09-28 VITALS
TEMPERATURE: 98 F | SYSTOLIC BLOOD PRESSURE: 138 MMHG | HEART RATE: 83 BPM | DIASTOLIC BLOOD PRESSURE: 84 MMHG | WEIGHT: 150 LBS | BODY MASS INDEX: 27.6 KG/M2 | HEIGHT: 62 IN | OXYGEN SATURATION: 100 %

## 2021-09-28 DIAGNOSIS — Z12.11 ENCOUNTER FOR SCREENING FOR MALIGNANT NEOPLASM OF COLON: Primary | ICD-10-CM

## 2021-09-28 PROCEDURE — S0285 CNSLT BEFORE SCREEN COLONOSC: HCPCS | Performed by: NURSE PRACTITIONER

## 2021-09-28 NOTE — PROGRESS NOTES
Chief Complaint   Patient presents with   • Colonoscopy     dr. melvin did last colon several years ago       PCP: Emily Rodriguez MD  REFER: Emily Rodriguez MD    Subjective     HPI    Debbie Lujan is a 61 y.o. female who presents to office for preventative maintenance.  There is not  a personal history of colon polyps.  There is not a history of colon cancer.  She does not have complaints of nausea/vomiting, change in bowels, weight loss, no BRBPR, no melena.  There is not a family history of colon cancer.  There is not a family history of colon polyps.  Her last colonoscopy-between 5-10 yrs ago .  Bowels do move on regular basis.  Utilizes hycosamine to relieve abdominal spasms.  This has occurred intermittently for many years and is not new.       Past Medical History:   Diagnosis Date   • Anxiety    • Arthritis    • Hyperlipidemia      Past Surgical History:   Procedure Laterality Date   • HYSTERECTOMY     • TRIGGER FINGER RELEASE       Outpatient Medications Marked as Taking for the 9/28/21 encounter (Office Visit) with Gee Rose APRN   Medication Sig Dispense Refill   • almotriptan (AXERT) 12.5 MG tablet almotriptan malate 12.5 mg tablet   one daily     • Cholecalciferol (vitamin D3) 125 MCG (5000 UT) tablet tablet Take  by mouth.     • Choline Fenofibrate (Fenofibric Acid) 45 MG capsule delayed-release      • escitalopram (LEXAPRO) 10 MG tablet escitalopram 10 mg tablet   one daily     • estrogens, conjugated, (PREMARIN) 0.3 MG tablet Take 0.3 mg by mouth.     • gabapentin (NEURONTIN) 300 MG capsule Take 300 mg by mouth Every 8 (Eight) Hours.     • HYDROcodone-acetaminophen (NORCO) 5-325 MG per tablet Take 1 tablet by mouth Every 6 (Six) Hours As Needed.     • hyoscyamine sulfate (ANASPAZ) 0.125 MG tablet dispersible disintegrating tablet 0.125 mg As Needed.     • Ibuprofen-Famotidine (Duexis) 800-26.6 MG tablet Take  by mouth.     • omeprazole (priLOSEC) 20 MG capsule      •  rosuvastatin (CRESTOR) 10 MG tablet rosuvastatin 10 mg tablet   one daily     • valACYclovir (VALTREX) 500 MG tablet Take 500 mg by mouth.       Allergies   Allergen Reactions   • Bactrim [Sulfamethoxazole-Trimethoprim] Shortness Of Breath     Social History     Socioeconomic History   • Marital status:      Spouse name: Not on file   • Number of children: Not on file   • Years of education: Not on file   • Highest education level: Not on file   Tobacco Use   • Smoking status: Current Every Day Smoker     Packs/day: 0.50     Years: 46.00     Pack years: 23.00   • Smokeless tobacco: Never Used   Vaping Use   • Vaping Use: Never used   Substance and Sexual Activity   • Alcohol use: Never   • Drug use: Never   • Sexual activity: Defer     Review of Systems   Constitutional: Negative for unexpected weight change.   Respiratory: Negative for shortness of breath.    Cardiovascular: Negative for chest pain.   Gastrointestinal: Negative for abdominal pain and anal bleeding.     Objective   Vitals:    09/28/21 0833   BP: 138/84   Pulse: 83   Temp: 98 °F (36.7 °C)   SpO2: 100%     Physical Exam  Constitutional:       Appearance: Normal appearance. She is well-developed.   Eyes:      General: No scleral icterus.  Cardiovascular:      Rate and Rhythm: Regular rhythm.      Heart sounds: Normal heart sounds. No murmur heard.     Pulmonary:      Effort: Pulmonary effort is normal. No accessory muscle usage.      Breath sounds: Normal breath sounds.   Abdominal:      General: Bowel sounds are normal. There is no distension.      Palpations: Abdomen is soft. There is no mass.      Tenderness: There is no abdominal tenderness. There is no guarding or rebound.   Skin:     General: Skin is warm and dry.      Coloration: Skin is not jaundiced.   Neurological:      Mental Status: She is alert.   Psychiatric:         Behavior: Behavior is cooperative.       Imaging Results (Most Recent)     None        Body mass index is 27.44  kg/m².    Assessment/Plan   Diagnoses and all orders for this visit:    1. Encounter for screening for malignant neoplasm of colon (Primary)  -     Case Request; Standing  -     Implement Anesthesia Orders Day of Procedure; Standing  -     Obtain Informed Consent; Standing  -     Case Request    Other orders  -     PEG-KCl-NaCl-NaSulf-Na Asc-C (PLENVU) 140 g reconstituted solution solution; Take 140 g by mouth Take As Directed.  Dispense: 1 each; Refill: 0      COLONOSCOPY WITH ANESTHESIA (N/A)        Advised pt to stop use of NSAIDs, Fish Oil, and MV 5 days prior to procedure, per Dr Bojorquez protocol.  Tylenol based products are ok to take.  Pt verbalized understanding.     All risks, benefits, alternatives, and indications of colonoscopy procedure have been discussed with the patient. Risks to include perforation of the colon requiring possible surgery or colostomy, risk of bleeding from biopsies or removal of colon tissue, possibility of missing a colon polyp or cancer, or adverse drug reaction.  Benefits to include the diagnosis and management of disease of the colon and rectum. Alternatives to include barium enema, radiographic evaluation, lab testing or no intervention. She verbalizes understanding and agrees.         Gee Roes, APRN  09/28/21        There are no Patient Instructions on file for this visit.

## 2021-10-07 ENCOUNTER — ANESTHESIA EVENT (OUTPATIENT)
Dept: GASTROENTEROLOGY | Facility: HOSPITAL | Age: 61
End: 2021-10-07

## 2021-10-07 ENCOUNTER — HOSPITAL ENCOUNTER (OUTPATIENT)
Facility: HOSPITAL | Age: 61
Setting detail: HOSPITAL OUTPATIENT SURGERY
Discharge: HOME | End: 2021-10-07
Attending: INTERNAL MEDICINE | Admitting: INTERNAL MEDICINE

## 2021-10-07 ENCOUNTER — ANESTHESIA (OUTPATIENT)
Dept: GASTROENTEROLOGY | Facility: HOSPITAL | Age: 61
End: 2021-10-07

## 2021-10-07 ENCOUNTER — TELEPHONE (OUTPATIENT)
Dept: GASTROENTEROLOGY | Facility: CLINIC | Age: 61
End: 2021-10-07

## 2021-10-07 VITALS
SYSTOLIC BLOOD PRESSURE: 137 MMHG | HEIGHT: 62 IN | OXYGEN SATURATION: 94 % | TEMPERATURE: 97.2 F | HEART RATE: 74 BPM | DIASTOLIC BLOOD PRESSURE: 72 MMHG | BODY MASS INDEX: 27.05 KG/M2 | WEIGHT: 147 LBS | RESPIRATION RATE: 16 BRPM

## 2021-10-07 DIAGNOSIS — Z12.11 ENCOUNTER FOR SCREENING FOR MALIGNANT NEOPLASM OF COLON: ICD-10-CM

## 2021-10-07 PROCEDURE — 88305 TISSUE EXAM BY PATHOLOGIST: CPT | Performed by: INTERNAL MEDICINE

## 2021-10-07 PROCEDURE — 45385 COLONOSCOPY W/LESION REMOVAL: CPT | Performed by: INTERNAL MEDICINE

## 2021-10-07 PROCEDURE — 25010000002 PROPOFOL 10 MG/ML EMULSION: Performed by: NURSE ANESTHETIST, CERTIFIED REGISTERED

## 2021-10-07 RX ORDER — SODIUM CHLORIDE 9 MG/ML
500 INJECTION, SOLUTION INTRAVENOUS CONTINUOUS PRN
Status: DISCONTINUED | OUTPATIENT
Start: 2021-10-07 | End: 2021-10-07 | Stop reason: HOSPADM

## 2021-10-07 RX ORDER — SODIUM CHLORIDE 0.9 % (FLUSH) 0.9 %
10 SYRINGE (ML) INJECTION AS NEEDED
Status: DISCONTINUED | OUTPATIENT
Start: 2021-10-07 | End: 2021-10-07 | Stop reason: HOSPADM

## 2021-10-07 RX ORDER — LIDOCAINE HYDROCHLORIDE 20 MG/ML
INJECTION, SOLUTION EPIDURAL; INFILTRATION; INTRACAUDAL; PERINEURAL AS NEEDED
Status: DISCONTINUED | OUTPATIENT
Start: 2021-10-07 | End: 2021-10-07 | Stop reason: SURG

## 2021-10-07 RX ORDER — PROPOFOL 10 MG/ML
VIAL (ML) INTRAVENOUS AS NEEDED
Status: DISCONTINUED | OUTPATIENT
Start: 2021-10-07 | End: 2021-10-07 | Stop reason: SURG

## 2021-10-07 RX ADMIN — PROPOFOL 30 MG: 10 INJECTION, EMULSION INTRAVENOUS at 10:05

## 2021-10-07 RX ADMIN — PROPOFOL 30 MG: 10 INJECTION, EMULSION INTRAVENOUS at 09:58

## 2021-10-07 RX ADMIN — PROPOFOL 30 MG: 10 INJECTION, EMULSION INTRAVENOUS at 09:57

## 2021-10-07 RX ADMIN — SODIUM CHLORIDE 500 ML: 9 INJECTION, SOLUTION INTRAVENOUS at 08:28

## 2021-10-07 RX ADMIN — LIDOCAINE HYDROCHLORIDE 80 MG: 20 INJECTION, SOLUTION EPIDURAL; INFILTRATION; INTRACAUDAL; PERINEURAL at 09:55

## 2021-10-07 RX ADMIN — PROPOFOL 30 MG: 10 INJECTION, EMULSION INTRAVENOUS at 10:02

## 2021-10-07 RX ADMIN — PROPOFOL 30 MG: 10 INJECTION, EMULSION INTRAVENOUS at 10:11

## 2021-10-07 RX ADMIN — PROPOFOL 70 MG: 10 INJECTION, EMULSION INTRAVENOUS at 09:55

## 2021-10-07 RX ADMIN — PROPOFOL 40 MG: 10 INJECTION, EMULSION INTRAVENOUS at 09:56

## 2021-10-07 NOTE — ANESTHESIA POSTPROCEDURE EVALUATION
Patient: Debbie Lujan    Procedure Summary     Date: 10/07/21 Room / Location: [facility] ENDOSCOPY 4 / [facility] ENDOSCOPY    Anesthesia Start: 0952 Anesthesia Stop: 1016    Procedure: COLONOSCOPY WITH ANESTHESIA (N/A ) Diagnosis:       Encounter for screening for malignant neoplasm of colon      (Encounter for screening for malignant neoplasm of colon [Z12.11])    Surgeons: Raphael Bojorquez DO Provider: Alber Evans CRNA    Anesthesia Type: MAC ASA Status: 2          Anesthesia Type: MAC    Vitals  Vitals Value Taken Time   /72 10/07/21 1031   Temp     Pulse 74 10/07/21 1031   Resp 16 10/07/21 1031   SpO2 94 % 10/07/21 1031           Post Anesthesia Care and Evaluation    Patient location during evaluation: PHASE II  Patient participation: complete - patient participated  Level of consciousness: awake  Pain score: 0  Pain management: adequate  Airway patency: patent  Anesthetic complications: No anesthetic complications  PONV Status: none  Cardiovascular status: acceptable  Respiratory status: acceptable  Hydration status: acceptable

## 2021-10-07 NOTE — ANESTHESIA PREPROCEDURE EVALUATION
Anesthesia Evaluation     Patient summary reviewed and Nursing notes reviewed   no history of anesthetic complications:  NPO Solid Status: > 8 hours  NPO Liquid Status: > 2 hours           Airway   Mallampati: II  TM distance: >3 FB  Neck ROM: full  No difficulty expected  Dental      Pulmonary    (+) a smoker Current,   Cardiovascular   Exercise tolerance: good (4-7 METS)    (+) hyperlipidemia,   (-) hypertension, CAD      Neuro/Psych  (+) psychiatric history Anxiety,     (-) seizures, TIA, CVA  GI/Hepatic/Renal/Endo    (+)  GERD,    (-) liver disease, no renal disease, diabetes    Musculoskeletal     Abdominal    Substance History - negative use     OB/GYN negative ob/gyn ROS         Other   arthritis,                    Anesthesia Plan    ASA 2     MAC     intravenous induction     Anesthetic plan, all risks, benefits, and alternatives have been provided, discussed and informed consent has been obtained with: patient.

## 2021-10-08 LAB
CYTO UR: NORMAL
LAB AP CASE REPORT: NORMAL
PATH REPORT.FINAL DX SPEC: NORMAL
PATH REPORT.GROSS SPEC: NORMAL

## 2021-10-26 ENCOUNTER — OFFICE VISIT (OUTPATIENT)
Dept: OBGYN CLINIC | Age: 61
End: 2021-10-26
Payer: COMMERCIAL

## 2021-10-26 VITALS
HEIGHT: 62 IN | BODY MASS INDEX: 27.97 KG/M2 | DIASTOLIC BLOOD PRESSURE: 88 MMHG | HEART RATE: 80 BPM | WEIGHT: 152 LBS | SYSTOLIC BLOOD PRESSURE: 160 MMHG

## 2021-10-26 DIAGNOSIS — Z01.419 WOMEN'S ANNUAL ROUTINE GYNECOLOGICAL EXAMINATION: Primary | ICD-10-CM

## 2021-10-26 DIAGNOSIS — Z12.31 ENCOUNTER FOR SCREENING MAMMOGRAM FOR MALIGNANT NEOPLASM OF BREAST: ICD-10-CM

## 2021-10-26 DIAGNOSIS — B00.9 HSV INFECTION: ICD-10-CM

## 2021-10-26 PROCEDURE — 99396 PREV VISIT EST AGE 40-64: CPT | Performed by: OBSTETRICS & GYNECOLOGY

## 2021-10-26 RX ORDER — IBUPROFEN AND FAMOTIDINE 26.6; 8 MG/1; MG/1
TABLET, FILM COATED ORAL
COMMUNITY
End: 2022-11-01

## 2021-10-26 RX ORDER — GABAPENTIN 300 MG/1
300 CAPSULE ORAL 3 TIMES DAILY
COMMUNITY
Start: 2021-10-22

## 2021-10-26 RX ORDER — HYDROCODONE BITARTRATE AND ACETAMINOPHEN 5; 325 MG/1; MG/1
1 TABLET ORAL 2 TIMES DAILY
Status: ON HOLD | COMMUNITY
Start: 2021-10-12 | End: 2021-12-21 | Stop reason: HOSPADM

## 2021-10-26 RX ORDER — VALACYCLOVIR HYDROCHLORIDE 500 MG/1
500 TABLET, FILM COATED ORAL DAILY
Qty: 90 TABLET | Refills: 3 | Status: SHIPPED | OUTPATIENT
Start: 2021-10-26 | End: 2022-11-01 | Stop reason: SDUPTHER

## 2021-10-26 ASSESSMENT — ENCOUNTER SYMPTOMS
RESPIRATORY NEGATIVE: 1
GASTROINTESTINAL NEGATIVE: 1
EYES NEGATIVE: 1

## 2021-10-26 NOTE — PATIENT INSTRUCTIONS
feel your breast tissue before moving on to the next spot. ? Check your entire breast, moving up and down as if following a strip from the collarbone to the bra line, and from the armpit to the ribs. Repeat until you have covered the entire breast.  ? Repeat this procedure for your left breast, using the pads of the 3 middle fingers of your right hand. · To examine your breasts while in the shower:  ? Place one arm over your head and lightly soap your breast on that side. ? Using the pads of your fingers, gently move your hand over your breast (in the strip pattern described above), feeling carefully for any lumps or changes. ? Repeat for the other breast.  · Have your doctor inspect anything you notice to see if you need further testing. Where can you learn more? Go to [email or web address] and sign in to your [employer] account. Enter P148 in the [employer] box to learn more about \"Breast Self-Exam: Care Instructions. \"     If you do not have an account, please click on the \"Sign Up Now\" link. Current as of: December 17, 2020               Content Version: 13.0  © 5255-3175 [employer]wise, Incorporated. Care instructions adapted under license by [facility] ([facility] [place]). If you have questions about a medical condition or this instruction, always ask your healthcare professional. [address] any warranty or liability for your use of this information.

## 2021-10-26 NOTE — PROGRESS NOTES
I, Zulema Son RN, am scribing for and in the presence of Dr. June Wharton 10/26/2021/11:38 AM/sign         10/26/2021    Kelley Mitchell (:  1960) is a 57 y.o. female, here for a preventive medicine evaluation. She had a hysterectomy and BSO in  due to carcinoma in Situ of cervix. Her pap smears have been normal since hysterectomy. She is needing refills on her Valtrex and Premarin. Last mammogram 10/2020. Patient Active Problem List   Diagnosis    Chest pressure       Review of Systems   Constitutional: Negative. HENT: Negative. Eyes: Negative. Respiratory: Negative. Cardiovascular: Negative. Gastrointestinal: Negative. Genitourinary: Negative for difficulty urinating, dyspareunia, dysuria, enuresis, frequency, hematuria, menstrual problem, pelvic pain, urgency and vaginal discharge. Musculoskeletal: Negative. Skin: Negative. Neurological: Negative. Psychiatric/Behavioral: Negative. Prior to Visit Medications    Medication Sig Taking?  Authorizing Provider   HYDROcodone-acetaminophen (Warnell Bills) 5-325 MG per tablet  Yes Historical Provider, MD   ibuprofen-famotidine (DUEXIS) 800-26.6 MG TABS Take by mouth Yes Historical Provider, MD   gabapentin (NEURONTIN) 300 MG capsule  Yes Historical Provider, MD   estrogens, conjugated, (PREMARIN) 0.3 MG tablet Take 1 tablet by mouth daily Indications: 0.3mg Yes Ashvin Pinzon MD   valACYclovir (VALTREX) 500 MG tablet Take 1 tablet by mouth daily Yes Ashvin Pinzon MD   almotriptan (AXERT) 12.5 MG tablet Take 12.5 mg by mouth once as needed for Migraine may repeat in 2 hours if needed Yes Historical Provider, MD   choline fenofibrate (TRILIPIX) 45 MG CPDR delayed release capsule Take 45 mg by mouth daily Yes Historical Provider, MD   Cholecalciferol (VITAMIN D3) 5000 units TABS Take by mouth Yes Historical Provider, MD   NEXIUM 40 MG capsule TAKE 1 CAPSULE DAILY Yes PEDRITO Jeter   Escitalopram Oxalate (LEXAPRO PO) Take  by mouth. Yes Historical Provider, MD   Rosuvastatin Calcium (CRESTOR PO) Take  by mouth. Yes Historical Provider, MD        No Known Allergies    Past Medical History:   Diagnosis Date    Abnormal Pap smear of cervix     Carcinoma in situ     Chest pressure 10/29/2014    10/29/2014  SE  negative for myocardial ischemia [facility])      Depression     Genital herpes     Osteoarthritis        Past Surgical History:   Procedure Laterality Date    APPENDECTOMY      CYST REMOVAL      FROM LEGS    HAND SURGERY      trigger thumb    MAYTE AND BSO         Social History     Socioeconomic History    Marital status:      Spouse name: Not on file    Number of children: Not on file    Years of education: Not on file    Highest education level: Not on file   Occupational History    Not on file   Tobacco Use    Smoking status: Current Every Day Smoker    Smokeless tobacco: Never Used   Vaping Use    Vaping Use: Never used   Substance and Sexual Activity    Alcohol use: Not Currently    Drug use: Never    Sexual activity: Not Currently   Other Topics Concern    Not on file   Social History Narrative    Not on file     Social Determinants of Health     Financial Resource Strain:     Difficulty of Paying Living Expenses:    Food Insecurity:     Worried About Running Out of Food in the Last Year:     Ran Out of Food in the Last Year:    Transportation Needs:     Lack of Transportation (Medical):      Lack of Transportation (Non-Medical):    Physical Activity:     Days of Exercise per Week:     Minutes of Exercise per Session:    Stress:     Feeling of Stress :    Social Connections:     Frequency of Communication with Friends and Family:     Frequency of Social Gatherings with Friends and Family:     Attends [employer] Services:     Active Member of Clubs or Organizations:     Attends Club or Organization Meetings:     Marital Status:    Intimate Partner Violence:     Fear of Current or Ex-Partner:     Emotionally Abused:     Physically Abused:     Sexually Abused:         Family History   Problem Relation Age of Onset    Cancer Maternal Grandmother     Diabetes Father     Eclampsia Father     Heart Failure Father     Eclampsia Mother     Cancer Sister        ADVANCE DIRECTIVE: N, <no information>    Vitals:    10/26/21 1005 10/26/21 1035   BP: (!) 168/90 (!) 160/88   Site: Right Upper Arm    Position: Sitting    Cuff Size: Medium Adult    Pulse: 80    Weight: 152 lb (68.9 kg)    Height: 5' 2\" (1.575 m)      Estimated body mass index is 27.8 kg/m² as calculated from the following:    Height as of this encounter: 5' 2\" (1.575 m). Weight as of this encounter: 152 lb (68.9 kg). Physical Exam  Constitutional:       Appearance: She is well-developed. HENT:      Head: Normocephalic and atraumatic. Right Ear: Hearing normal.      Left Ear: Hearing normal.      Nose: Nose normal.   Eyes:      General: Lids are normal.      Conjunctiva/sclera: Conjunctivae normal.      Pupils: Pupils are equal, round, and reactive to light. Cardiovascular:      Rate and Rhythm: Normal rate and regular rhythm. Heart sounds: No murmur heard. No friction rub. No gallop. Pulmonary:      Effort: Pulmonary effort is normal.      Breath sounds: Normal breath sounds. Chest:      Breasts: Breasts are symmetrical.         Right: No inverted nipple, mass, nipple discharge, skin change or tenderness. Left: No inverted nipple, mass, nipple discharge, skin change or tenderness. Abdominal:      General: Bowel sounds are normal. There is no distension. Palpations: Abdomen is soft. There is no mass. Tenderness: There is no abdominal tenderness. Genitourinary:     Labia:         Right: No rash, tenderness or lesion. Left: No rash, tenderness or lesion. Vagina: No vaginal discharge or bleeding. Rectum: No anal fissure or external hemorrhoid.       Comments:  Vaginal refilled  Mammogram ordered and pt will basia  Pt will try a clipper instead of a razor to pubic area to see if that helps ingrown hairs. Pt will f/u with Dr. Zeke Aguilera regarding HTN  All questions answered    Return in about 1 year (around 10/26/2022). An electronic signature was used to authenticate this note. Beba Prater MD, personally performed services described in this document as scribed by Rogers Doty RN in my presence, and it is both accurate and complete.

## 2021-11-02 ENCOUNTER — HOSPITAL ENCOUNTER (OUTPATIENT)
Dept: WOMENS IMAGING | Age: 61
Discharge: HOME | End: 2021-11-02
Payer: COMMERCIAL

## 2021-11-02 DIAGNOSIS — Z12.31 ENCOUNTER FOR SCREENING MAMMOGRAM FOR MALIGNANT NEOPLASM OF BREAST: ICD-10-CM

## 2021-11-02 PROCEDURE — 77063 BREAST TOMOSYNTHESIS BI: CPT

## 2021-12-13 ENCOUNTER — HOSPITAL ENCOUNTER (OUTPATIENT)
Dept: PREADMISSION TESTING | Age: 61
Discharge: HOME | End: 2021-12-17
Payer: COMMERCIAL

## 2021-12-13 VITALS — WEIGHT: 147 LBS | HEIGHT: 62 IN | BODY MASS INDEX: 27.05 KG/M2

## 2021-12-13 LAB
ANION GAP SERPL CALCULATED.3IONS-SCNC: 15 MMOL/L (ref 7–19)
BASOPHILS ABSOLUTE: 0 K/UL (ref 0–0.2)
BASOPHILS RELATIVE PERCENT: 0.2 % (ref 0–1)
BUN BLDV-MCNC: 18 MG/DL (ref 8–23)
CALCIUM SERPL-MCNC: 9.8 MG/DL (ref 8.8–10.2)
CHLORIDE BLD-SCNC: 105 MMOL/L (ref 98–111)
CO2: 21 MMOL/L (ref 22–29)
CREAT SERPL-MCNC: 0.7 MG/DL (ref 0.5–0.9)
EKG P AXIS: 35 DEGREES
EKG P-R INTERVAL: 162 MS
EKG Q-T INTERVAL: 366 MS
EKG QRS DURATION: 76 MS
EKG QTC CALCULATION (BAZETT): 395 MS
EKG T AXIS: 21 DEGREES
EOSINOPHILS ABSOLUTE: 0.3 K/UL (ref 0–0.6)
EOSINOPHILS RELATIVE PERCENT: 3.6 % (ref 0–5)
GFR AFRICAN AMERICAN: >59
GFR NON-AFRICAN AMERICAN: >60
GLUCOSE BLD-MCNC: 113 MG/DL (ref 74–109)
HCT VFR BLD CALC: 47.6 % (ref 37–47)
HEMOGLOBIN: 15.4 G/DL (ref 12–16)
IMMATURE GRANULOCYTES #: 0 K/UL
LYMPHOCYTES ABSOLUTE: 2.6 K/UL (ref 1.1–4.5)
LYMPHOCYTES RELATIVE PERCENT: 27.7 % (ref 20–40)
MCH RBC QN AUTO: 32.2 PG (ref 27–31)
MCHC RBC AUTO-ENTMCNC: 32.4 G/DL (ref 33–37)
MCV RBC AUTO: 99.4 FL (ref 81–99)
MONOCYTES ABSOLUTE: 0.6 K/UL (ref 0–0.9)
MONOCYTES RELATIVE PERCENT: 6.7 % (ref 0–10)
NEUTROPHILS ABSOLUTE: 5.8 K/UL (ref 1.5–7.5)
NEUTROPHILS RELATIVE PERCENT: 61.6 % (ref 50–65)
PDW BLD-RTO: 12.4 % (ref 11.5–14.5)
PLATELET # BLD: 262 K/UL (ref 130–400)
PMV BLD AUTO: 11 FL (ref 9.4–12.3)
POTASSIUM SERPL-SCNC: 3.7 MMOL/L (ref 3.5–5)
RBC # BLD: 4.79 M/UL (ref 4.2–5.4)
SODIUM BLD-SCNC: 141 MMOL/L (ref 136–145)
WBC # BLD: 9.4 K/UL (ref 4.8–10.8)

## 2021-12-13 PROCEDURE — 85025 COMPLETE CBC W/AUTO DIFF WBC: CPT

## 2021-12-13 PROCEDURE — 93005 ELECTROCARDIOGRAM TRACING: CPT | Performed by: ORTHOPAEDIC SURGERY

## 2021-12-13 PROCEDURE — 80048 BASIC METABOLIC PNL TOTAL CA: CPT

## 2021-12-13 RX ORDER — LOSARTAN POTASSIUM 50 MG/1
50 TABLET ORAL DAILY
COMMUNITY

## 2021-12-13 RX ORDER — NICOTINE POLACRILEX 4 MG/1
20 GUM, CHEWING ORAL DAILY
COMMUNITY

## 2021-12-13 RX ORDER — FENOFIBRIC ACID 45 MG/1
45 CAPSULE, DELAYED RELEASE ORAL DAILY
COMMUNITY

## 2021-12-21 ENCOUNTER — ANESTHESIA EVENT (OUTPATIENT)
Dept: OPERATING ROOM | Age: 61
End: 2021-12-21
Payer: COMMERCIAL

## 2021-12-21 ENCOUNTER — HOSPITAL ENCOUNTER (OUTPATIENT)
Age: 61
Setting detail: OUTPATIENT SURGERY
Discharge: HOME | End: 2021-12-21
Attending: ORTHOPAEDIC SURGERY | Admitting: ORTHOPAEDIC SURGERY
Payer: COMMERCIAL

## 2021-12-21 ENCOUNTER — ANESTHESIA (OUTPATIENT)
Dept: OPERATING ROOM | Age: 61
End: 2021-12-21
Payer: COMMERCIAL

## 2021-12-21 VITALS
WEIGHT: 147 LBS | SYSTOLIC BLOOD PRESSURE: 156 MMHG | DIASTOLIC BLOOD PRESSURE: 79 MMHG | RESPIRATION RATE: 16 BRPM | HEIGHT: 62 IN | OXYGEN SATURATION: 99 % | BODY MASS INDEX: 27.05 KG/M2 | TEMPERATURE: 97.3 F | HEART RATE: 99 BPM

## 2021-12-21 VITALS
SYSTOLIC BLOOD PRESSURE: 123 MMHG | OXYGEN SATURATION: 100 % | DIASTOLIC BLOOD PRESSURE: 58 MMHG | RESPIRATION RATE: 25 BRPM | TEMPERATURE: 97 F

## 2021-12-21 DIAGNOSIS — M65.312 TRIGGER THUMB OF LEFT HAND: Primary | ICD-10-CM

## 2021-12-21 PROCEDURE — 6360000002 HC RX W HCPCS: Performed by: ORTHOPAEDIC SURGERY

## 2021-12-21 PROCEDURE — 2500000003 HC RX 250 WO HCPCS: Performed by: ANESTHESIOLOGY

## 2021-12-21 PROCEDURE — 2500000003 HC RX 250 WO HCPCS: Performed by: NURSE ANESTHETIST, CERTIFIED REGISTERED

## 2021-12-21 PROCEDURE — 7100000001 HC PACU RECOVERY - ADDTL 15 MIN: Performed by: ORTHOPAEDIC SURGERY

## 2021-12-21 PROCEDURE — 7100000000 HC PACU RECOVERY - FIRST 15 MIN: Performed by: ORTHOPAEDIC SURGERY

## 2021-12-21 PROCEDURE — 6370000000 HC RX 637 (ALT 250 FOR IP): Performed by: ORTHOPAEDIC SURGERY

## 2021-12-21 PROCEDURE — 3600000013 HC SURGERY LEVEL 3 ADDTL 15MIN: Performed by: ORTHOPAEDIC SURGERY

## 2021-12-21 PROCEDURE — 3700000000 HC ANESTHESIA ATTENDED CARE: Performed by: ORTHOPAEDIC SURGERY

## 2021-12-21 PROCEDURE — 3600000003 HC SURGERY LEVEL 3 BASE: Performed by: ORTHOPAEDIC SURGERY

## 2021-12-21 PROCEDURE — 3700000001 HC ADD 15 MINUTES (ANESTHESIA): Performed by: ORTHOPAEDIC SURGERY

## 2021-12-21 PROCEDURE — 2580000003 HC RX 258: Performed by: ORTHOPAEDIC SURGERY

## 2021-12-21 PROCEDURE — 7100000010 HC PHASE II RECOVERY - FIRST 15 MIN: Performed by: ORTHOPAEDIC SURGERY

## 2021-12-21 PROCEDURE — 6360000002 HC RX W HCPCS: Performed by: ANESTHESIOLOGY

## 2021-12-21 PROCEDURE — 2709999900 HC NON-CHARGEABLE SUPPLY: Performed by: ORTHOPAEDIC SURGERY

## 2021-12-21 PROCEDURE — 6360000002 HC RX W HCPCS: Performed by: NURSE ANESTHETIST, CERTIFIED REGISTERED

## 2021-12-21 RX ORDER — ONDANSETRON 2 MG/ML
INJECTION INTRAMUSCULAR; INTRAVENOUS PRN
Status: DISCONTINUED | OUTPATIENT
Start: 2021-12-21 | End: 2021-12-21 | Stop reason: SDUPTHER

## 2021-12-21 RX ORDER — OXYCODONE HYDROCHLORIDE 5 MG/1
5 TABLET ORAL EVERY 4 HOURS PRN
Qty: 10 TABLET | Refills: 0 | Status: SHIPPED | OUTPATIENT
Start: 2021-12-21 | End: 2021-12-23

## 2021-12-21 RX ORDER — LIDOCAINE HYDROCHLORIDE 10 MG/ML
1 INJECTION, SOLUTION EPIDURAL; INFILTRATION; INTRACAUDAL; PERINEURAL
Status: DISCONTINUED | OUTPATIENT
Start: 2021-12-21 | End: 2021-12-21 | Stop reason: HOSPADM

## 2021-12-21 RX ORDER — SODIUM CHLORIDE 0.9 % (FLUSH) 0.9 %
5-40 SYRINGE (ML) INJECTION PRN
Status: DISCONTINUED | OUTPATIENT
Start: 2021-12-21 | End: 2021-12-21 | Stop reason: HOSPADM

## 2021-12-21 RX ORDER — FENTANYL CITRATE 50 UG/ML
50 INJECTION, SOLUTION INTRAMUSCULAR; INTRAVENOUS EVERY 5 MIN PRN
Status: DISCONTINUED | OUTPATIENT
Start: 2021-12-21 | End: 2021-12-21 | Stop reason: HOSPADM

## 2021-12-21 RX ORDER — CEFAZOLIN SODIUM 2 G/100ML
2000 INJECTION, SOLUTION INTRAVENOUS ONCE
Status: COMPLETED | OUTPATIENT
Start: 2021-12-21 | End: 2021-12-21

## 2021-12-21 RX ORDER — SODIUM CHLORIDE 0.9 % (FLUSH) 0.9 %
5-40 SYRINGE (ML) INJECTION EVERY 12 HOURS SCHEDULED
Status: DISCONTINUED | OUTPATIENT
Start: 2021-12-21 | End: 2021-12-21 | Stop reason: HOSPADM

## 2021-12-21 RX ORDER — ROPIVACAINE HYDROCHLORIDE 2 MG/ML
INJECTION, SOLUTION EPIDURAL; INFILTRATION; PERINEURAL PRN
Status: DISCONTINUED | OUTPATIENT
Start: 2021-12-21 | End: 2021-12-21 | Stop reason: ALTCHOICE

## 2021-12-21 RX ORDER — SODIUM CHLORIDE, SODIUM LACTATE, POTASSIUM CHLORIDE, CALCIUM CHLORIDE 600; 310; 30; 20 MG/100ML; MG/100ML; MG/100ML; MG/100ML
INJECTION, SOLUTION INTRAVENOUS CONTINUOUS
Status: DISCONTINUED | OUTPATIENT
Start: 2021-12-21 | End: 2021-12-21 | Stop reason: HOSPADM

## 2021-12-21 RX ORDER — PROCHLORPERAZINE EDISYLATE 5 MG/ML
5 INJECTION INTRAMUSCULAR; INTRAVENOUS
Status: DISCONTINUED | OUTPATIENT
Start: 2021-12-21 | End: 2021-12-21 | Stop reason: HOSPADM

## 2021-12-21 RX ORDER — HYDRALAZINE HYDROCHLORIDE 20 MG/ML
5 INJECTION INTRAMUSCULAR; INTRAVENOUS EVERY 10 MIN PRN
Status: DISCONTINUED | OUTPATIENT
Start: 2021-12-21 | End: 2021-12-21 | Stop reason: HOSPADM

## 2021-12-21 RX ORDER — ONDANSETRON 2 MG/ML
4 INJECTION INTRAMUSCULAR; INTRAVENOUS
Status: DISCONTINUED | OUTPATIENT
Start: 2021-12-21 | End: 2021-12-21 | Stop reason: HOSPADM

## 2021-12-21 RX ORDER — DEXAMETHASONE SODIUM PHOSPHATE 10 MG/ML
10 INJECTION, SOLUTION INTRAMUSCULAR; INTRAVENOUS ONCE
Status: COMPLETED | OUTPATIENT
Start: 2021-12-21 | End: 2021-12-21

## 2021-12-21 RX ORDER — DIPHENHYDRAMINE HYDROCHLORIDE 50 MG/ML
12.5 INJECTION INTRAMUSCULAR; INTRAVENOUS
Status: DISCONTINUED | OUTPATIENT
Start: 2021-12-21 | End: 2021-12-21 | Stop reason: HOSPADM

## 2021-12-21 RX ORDER — LIDOCAINE HYDROCHLORIDE 10 MG/ML
INJECTION, SOLUTION INFILTRATION; PERINEURAL PRN
Status: DISCONTINUED | OUTPATIENT
Start: 2021-12-21 | End: 2021-12-21 | Stop reason: SDUPTHER

## 2021-12-21 RX ORDER — PROPOFOL 10 MG/ML
INJECTION, EMULSION INTRAVENOUS PRN
Status: DISCONTINUED | OUTPATIENT
Start: 2021-12-21 | End: 2021-12-21 | Stop reason: SDUPTHER

## 2021-12-21 RX ORDER — FENTANYL CITRATE 50 UG/ML
25 INJECTION, SOLUTION INTRAMUSCULAR; INTRAVENOUS
Status: DISCONTINUED | OUTPATIENT
Start: 2021-12-21 | End: 2021-12-21 | Stop reason: HOSPADM

## 2021-12-21 RX ORDER — SODIUM CHLORIDE 9 MG/ML
INJECTION, SOLUTION INTRAVENOUS CONTINUOUS
Status: DISCONTINUED | OUTPATIENT
Start: 2021-12-21 | End: 2021-12-21 | Stop reason: HOSPADM

## 2021-12-21 RX ORDER — SODIUM CHLORIDE 9 MG/ML
25 INJECTION, SOLUTION INTRAVENOUS PRN
Status: DISCONTINUED | OUTPATIENT
Start: 2021-12-21 | End: 2021-12-21 | Stop reason: HOSPADM

## 2021-12-21 RX ORDER — MIDAZOLAM HYDROCHLORIDE 1 MG/ML
2 INJECTION INTRAMUSCULAR; INTRAVENOUS
Status: DISCONTINUED | OUTPATIENT
Start: 2021-12-21 | End: 2021-12-21 | Stop reason: HOSPADM

## 2021-12-21 RX ORDER — MEPERIDINE HYDROCHLORIDE 25 MG/ML
12.5 INJECTION INTRAMUSCULAR; INTRAVENOUS; SUBCUTANEOUS EVERY 5 MIN PRN
Status: DISCONTINUED | OUTPATIENT
Start: 2021-12-21 | End: 2021-12-21 | Stop reason: HOSPADM

## 2021-12-21 RX ORDER — BISMUTH TRIBROMOPH/PETROLATUM 5"X9"
BANDAGE TOPICAL PRN
Status: DISCONTINUED | OUTPATIENT
Start: 2021-12-21 | End: 2021-12-21 | Stop reason: ALTCHOICE

## 2021-12-21 RX ORDER — FENTANYL CITRATE 50 UG/ML
INJECTION, SOLUTION INTRAMUSCULAR; INTRAVENOUS PRN
Status: DISCONTINUED | OUTPATIENT
Start: 2021-12-21 | End: 2021-12-21 | Stop reason: SDUPTHER

## 2021-12-21 RX ORDER — HYDROMORPHONE HYDROCHLORIDE 1 MG/ML
0.5 INJECTION, SOLUTION INTRAMUSCULAR; INTRAVENOUS; SUBCUTANEOUS EVERY 5 MIN PRN
Status: DISCONTINUED | OUTPATIENT
Start: 2021-12-21 | End: 2021-12-21 | Stop reason: HOSPADM

## 2021-12-21 RX ORDER — FENTANYL CITRATE 50 UG/ML
50 INJECTION, SOLUTION INTRAMUSCULAR; INTRAVENOUS
Status: DISCONTINUED | OUTPATIENT
Start: 2021-12-21 | End: 2021-12-21 | Stop reason: HOSPADM

## 2021-12-21 RX ORDER — LABETALOL HYDROCHLORIDE 5 MG/ML
5 INJECTION, SOLUTION INTRAVENOUS EVERY 10 MIN PRN
Status: DISCONTINUED | OUTPATIENT
Start: 2021-12-21 | End: 2021-12-21 | Stop reason: HOSPADM

## 2021-12-21 RX ORDER — FENTANYL CITRATE 50 UG/ML
25 INJECTION, SOLUTION INTRAMUSCULAR; INTRAVENOUS EVERY 5 MIN PRN
Status: DISCONTINUED | OUTPATIENT
Start: 2021-12-21 | End: 2021-12-21 | Stop reason: HOSPADM

## 2021-12-21 RX ORDER — HYDROMORPHONE HYDROCHLORIDE 1 MG/ML
0.25 INJECTION, SOLUTION INTRAMUSCULAR; INTRAVENOUS; SUBCUTANEOUS EVERY 5 MIN PRN
Status: DISCONTINUED | OUTPATIENT
Start: 2021-12-21 | End: 2021-12-21 | Stop reason: HOSPADM

## 2021-12-21 RX ORDER — ENALAPRILAT 2.5 MG/2ML
1.25 INJECTION INTRAVENOUS
Status: DISCONTINUED | OUTPATIENT
Start: 2021-12-21 | End: 2021-12-21 | Stop reason: HOSPADM

## 2021-12-21 RX ADMIN — PROPOFOL 160 MG: 10 INJECTION, EMULSION INTRAVENOUS at 07:08

## 2021-12-21 RX ADMIN — FENTANYL CITRATE 50 MCG: 50 INJECTION, SOLUTION INTRAMUSCULAR; INTRAVENOUS at 07:07

## 2021-12-21 RX ADMIN — DEXAMETHASONE SODIUM PHOSPHATE 10 MG: 10 INJECTION INTRAMUSCULAR; INTRAVENOUS at 06:36

## 2021-12-21 RX ADMIN — LIDOCAINE HYDROCHLORIDE 50 MG: 10 INJECTION, SOLUTION INFILTRATION; PERINEURAL at 07:07

## 2021-12-21 RX ADMIN — CEFAZOLIN SODIUM 2000 MG: 2 INJECTION, SOLUTION INTRAVENOUS at 07:13

## 2021-12-21 RX ADMIN — FAMOTIDINE 20 MG: 10 INJECTION, SOLUTION INTRAVENOUS at 06:36

## 2021-12-21 RX ADMIN — HYDROMORPHONE HYDROCHLORIDE 0.5 MG: 1 INJECTION, SOLUTION INTRAMUSCULAR; INTRAVENOUS; SUBCUTANEOUS at 08:05

## 2021-12-21 RX ADMIN — ONDANSETRON 4 MG: 2 INJECTION INTRAMUSCULAR; INTRAVENOUS at 07:20

## 2021-12-21 RX ADMIN — HYDROMORPHONE HYDROCHLORIDE 0.5 MG: 1 INJECTION, SOLUTION INTRAMUSCULAR; INTRAVENOUS; SUBCUTANEOUS at 07:55

## 2021-12-21 RX ADMIN — SODIUM CHLORIDE, POTASSIUM CHLORIDE, SODIUM LACTATE AND CALCIUM CHLORIDE: 600; 310; 30; 20 INJECTION, SOLUTION INTRAVENOUS at 05:53

## 2021-12-21 ASSESSMENT — PAIN DESCRIPTION - DESCRIPTORS
DESCRIPTORS: ACHING
DESCRIPTORS: DULL
DESCRIPTORS: THROBBING
DESCRIPTORS: ACHING

## 2021-12-21 ASSESSMENT — PAIN SCALES - GENERAL
PAINLEVEL_OUTOF10: 2
PAINLEVEL_OUTOF10: 3
PAINLEVEL_OUTOF10: 2
PAINLEVEL_OUTOF10: 7
PAINLEVEL_OUTOF10: 7

## 2021-12-21 ASSESSMENT — PAIN DESCRIPTION - LOCATION
LOCATION: HAND
LOCATION: FINGER (COMMENT WHICH ONE)
LOCATION: FINGER (COMMENT WHICH ONE)
LOCATION: HAND

## 2021-12-21 ASSESSMENT — PAIN DESCRIPTION - ORIENTATION
ORIENTATION: LEFT
ORIENTATION: LEFT

## 2021-12-21 ASSESSMENT — LIFESTYLE VARIABLES: SMOKING_STATUS: 1

## 2021-12-21 ASSESSMENT — PAIN DESCRIPTION - PROGRESSION: CLINICAL_PROGRESSION: GRADUALLY IMPROVING

## 2021-12-21 ASSESSMENT — PAIN DESCRIPTION - PAIN TYPE
TYPE: SURGICAL PAIN

## 2021-12-21 ASSESSMENT — ENCOUNTER SYMPTOMS: SHORTNESS OF BREATH: 0

## 2021-12-21 NOTE — H&P
Orthopaedic Inpatient Consultation    NAME:  Carla Anderson   : 1960  MRN: [id number]    2021  5:11 AM        CHIEF COMPLAINT:  left trigger thumb      HISTORY OF PRESENT ILLNESS:   The patient is a 57 y.o. female who presents with the above complaint for several months. Painful catching in the left and stiffness. She has had a right trigger thumb A1 pulley release in \[facility] [place] [facility]\"" years ago and developed flexor tenosynovitis bacterial infection. Past Medical History:        Diagnosis Date    Abnormal Pap smear of cervix     Carcinoma in situ     Chest pressure 10/29/2014    10/29/2014  SE  negative for myocardial ischemia [facility])      Depression     Genital herpes     Hyperlipidemia     Hypertension     Osteoarthritis     BACK       Past Surgical History:        Procedure Laterality Date    APPENDECTOMY      CYST REMOVAL      FROM LEGS    HAND SURGERY      trigger thumb    HYSTERECTOMY      MAYTE AND BSO Bilateral     age 27       Current Medications:   Prior to Admission medications    Medication Sig Start Date End Date Taking? Authorizing Provider   omeprazole 20 MG EC tablet Take 20 mg by mouth daily   Yes Historical Provider, MD   losartan (COZAAR) 50 MG tablet Take 50 mg by mouth daily   Yes Historical Provider, MD   fenofibric acid (FIBRICOR) 45 MG CPDR capsule Take 45 mg by mouth daily   Yes Historical Provider, MD   HYDROcodone-acetaminophen (NORCO) 5-325 MG per tablet Take 1 tablet by mouth 2 times daily. 10/12/21  Yes Historical Provider, MD   gabapentin (NEURONTIN) 300 MG capsule Take 300 mg by mouth 3 times daily.   10/22/21  Yes Historical Provider, MD   estrogens, conjugated, (PREMARIN) 0.3 MG tablet Take 1 tablet by mouth daily Indications: 0.3mg 10/26/21  Yes Tanesha Oliveros MD   Cholecalciferol (VITAMIN D3) 5000 units TABS Take 1 tablet by mouth daily    Yes Historical Provider, MD   Escitalopram Oxalate (LEXAPRO PO) Take 10 mg by mouth daily    Yes Historical Provider, MD   ibuprofen-famotidine (DUEXIS) 800-26.6 MG TABS Take by mouth    Historical Provider, MD   valACYclovir (VALTREX) 500 MG tablet Take 1 tablet by mouth daily 10/26/21   Saritha Hugo MD   almotriptan (AXERT) 12.5 MG tablet Take 12.5 mg by mouth once as needed for Migraine may repeat in 2 hours if needed    Historical Provider, MD   Rosuvastatin Calcium (CRESTOR PO) Take 10 mg by mouth daily     Historical Provider, MD       Allergies:  Bactrim [sulfamethoxazole-trimethoprim]    Social History:   Social History     Socioeconomic History    Marital status:      Spouse name: Not on file    Number of children: Not on file    Years of education: Not on file    Highest education level: Not on file   Occupational History    Not on file   Tobacco Use    Smoking status: Former Smoker     Quit date: 2021     Years since quittin.0    Smokeless tobacco: Never Used    Tobacco comment: USING A PATCH on today    Vaping Use    Vaping Use: Never used   Substance and Sexual Activity    Alcohol use: Not Currently    Drug use: Never    Sexual activity: Not Currently   Other Topics Concern    Not on file   Social History Narrative    Not on file     Social Determinants of Health     Financial Resource Strain:     Difficulty of Paying Living Expenses: Not on file   Food Insecurity:     Worried About [address] in the Last Year: Not on file    [address] in the Last Year: Not on file   Transportation Needs:     Lack of Transportation (Medical): Not on file    Lack of Transportation (Non-Medical):  Not on file   Physical Activity:     Days of Exercise per Week: Not on file    Minutes of Exercise per Session: Not on file   Stress:     Feeling of Stress : Not on file   Social Connections:     Frequency of Communication with Friends and Family: Not on file    Frequency of Social Gatherings with Friends and Family: Not on file    Attends [employer] Services: Not on file   Alba Rich Active Member of Clubs or Organizations: Not on file    Attends Club or Organization Meetings: Not on file    Marital Status: Not on file   Intimate Partner Violence:     Fear of Current or Ex-Partner: Not on file    Emotionally Abused: Not on file    Physically Abused: Not on file    Sexually Abused: Not on file   Housing Stability:     Unable to Pay for Housing in the Last Year: Not on file    Number of [place] in the Last Year: Not on file    Unstable Housing in the Last Year: Not on file       Family History:   Family History   Problem Relation Age of Onset    Cancer Maternal Grandmother     Diabetes Father     Eclampsia Father     Heart Failure Father     Heart Disease Father     Eclampsia Mother     Stroke Mother     Cancer Sister        REVIEW OF SYSTEMS:  14 point review of systems has been reviewed from the patient's emergency room visit, reviewed with the patient on today's date with no new changes. PHYSICAL EXAM:      Physical Examination:  Vitals:   Vitals:    12/21/21 0543   BP: (!) 152/72   Pulse: 65   Resp: 12   Temp: 97.4 °F (36.3 °C)   TempSrc: Temporal   SpO2: 98%   Weight: 147 lb (66.7 kg)   Height: 5' 2\" (1.575 m)     General:  Appears stated age, no distress. Orientation:  Alert and oriented to time, place, and person. Mood and Affect:  Cooperative and pleasant. Gait:  Resting comfortably in bed. Cardiovascular:  Symmetric 1-2 plus pulses in upper and lower extremities. Lymph:  No cervical or inguinal lymphadenopathy noted. Sensation:  Grossly intact to light touch. DTR:  Normal, no pathologic reflexes. Coordination/balance:  Normal    Musculoskeletal:  Left upper extremity is neurovascularly intact. No swelling or deformity. Painful catching a left thumb. Tender over the A1 pulley.   DATA:    CBC with Differential:    Lab Results   Component Value Date    WBC 9.4 12/13/2021    RBC 4.79 12/13/2021    HGB 15.4 12/13/2021    HCT 47.6 12/13/2021     12/13/2021    MCV 99.4 12/13/2021    MCH 32.2 12/13/2021    MCHC 32.4 12/13/2021    RDW 12.4 12/13/2021    LYMPHOPCT 27.7 12/13/2021    MONOPCT 6.7 12/13/2021    BASOPCT 0.2 12/13/2021    MONOSABS 0.60 12/13/2021    LYMPHSABS 2.6 12/13/2021    EOSABS 0.30 12/13/2021    BASOSABS 0.00 12/13/2021     CMP:    Lab Results   Component Value Date     12/13/2021    K 3.7 12/13/2021     12/13/2021    CO2 21 12/13/2021    BUN 18 12/13/2021    CREATININE 0.7 12/13/2021    GFRAA >59 12/13/2021    LABGLOM >60 12/13/2021    GLUCOSE 113 12/13/2021    CALCIUM 9.8 12/13/2021     BMP:    Lab Results   Component Value Date     12/13/2021    K 3.7 12/13/2021     12/13/2021    CO2 21 12/13/2021    BUN 18 12/13/2021    CREATININE 0.7 12/13/2021    CALCIUM 9.8 12/13/2021    GFRAA >59 12/13/2021    LABGLOM >60 12/13/2021    GLUCOSE 113 12/13/2021         Radiology: No results found. Assessment:   Left trigger thumb    Plan: Left thumb A1 pulley release. I explained to the patient/family the patient's diagnosis and operative procedure in detail. They said they understood basically what was wrong and how I planned to fix it. They understand the expected recovery and the risks which include excessive bleeding, infection, reaction to anesthesia, nerve injury, stiffness, fracture, deformity and dislocation. They then signed an operative consent form. Provider:  William Lozoya MD  Date: 12/21/2021

## 2021-12-21 NOTE — OP NOTE
OPERATIVE NOTE  Trigger finger release    Patient:  Jessika Velasco    Date:  12/21/2021    Medical Record Number:  [id number]    Primary Pre-Operative Diagnosis:  Left  thumb triggering    Primary Post-Operative Diagnosis:  Same    Procedure:  Left thumb A1 Pulley Release      Assistant: none      Anesthesia:  Local with sedation    Estimated Blood Loss:  Minimal    Complications:  None    Findings:  As above    Indications: The patient has failed conservative treatment and presents now for the above procedure. . She understands the benefits and risks and consents freely. Risks are bleeding, infection, anesthesia reaction, nerve injury, stiffness. Procedure:  Patient was brought into the operationg room and the upper extremity was prepped with chlorhexidene/alcohol and sterilelydraped. The patient was given intravenous sedation and the area of the incision was anesthetized with 8 ml of 0.2% naropin using a 23 gauge needle. An esmarch was tightly wrapped distal to proximal and padded with a towel in the mid forearm to act as a tourniquet. A half inch transverse incision was made over the A1 pulley just through skin. Longitudinal scissor dissection was taken down to the A1 janes. Hayley retractors were used to protect soft tissues and the digital nerves. The A1 was incised with a 15 blade. The flexor tendons were intact. The esmarch was cut and the wound rinsed with saline. There was minimal bleeding. The incision(s) was closed with 4-0 nylon, horizontal mattress sutures and a sterile dressing applied. The patient was awakened and transferred to recovery in stable condition. Plan: Activity as tolerated. Follow-up in 2 weeks.       Electronically signed by Michael Velasco MD on 12/21/2021 at 7:28 AM

## 2021-12-21 NOTE — ANESTHESIA POSTPROCEDURE EVALUATION
Department of Anesthesiology  Postprocedure Note    Patient: Rae Jefferson  MRN: [id number]  YOB: 1960  Date of evaluation: 12/21/2021  Time:  7:39 AM     Procedure Summary     Date: 12/21/21 Room / Location: [address]    Anesthesia Start: 9772 Anesthesia Stop:     Procedure: LEFT THUMB A1 PULLEY RELEASE (Left Hand) Diagnosis: (W49.705)    Surgeons: Tana Hewitt MD Responsible Provider: PEDRITO Murray CRNA    Anesthesia Type: general ASA Status: 2          Anesthesia Type: No value filed. Jai Phase I: Jai Score: 10    Jai Phase II:      Last vitals: Reviewed and per EMR flowsheets.        Anesthesia Post Evaluation    Patient location during evaluation: PACU  Patient participation: waiting for patient participation  Pain score: 0  Airway patency: patent  Nausea & Vomiting: no nausea and no vomiting  Complications: no  Cardiovascular status: blood pressure returned to baseline  Respiratory status: oral airway  Hydration status: euvolemic  Comments: Report to RN

## 2021-12-21 NOTE — PROGRESS NOTES
CLINICAL PHARMACY NOTE: MEDS TO BEDS    Total # of Prescriptions Filled: 1   The following medications were delivered to the patient:  · Oxycodone 5mg    Additional Documentation:  The patients medications were handed to the patients  in the patients room in [facility].

## 2021-12-21 NOTE — ANESTHESIA PRE PROCEDURE
Department of Anesthesiology  Preprocedure Note       Name:  Bozena Grove   Age:  57 y.o.  :  1960                                          MRN:  [id number]         Date:  2021      Surgeon: Dhruv Amos):  Adelia Siemens, MD    Procedure: Procedure(s):  LEFT THUMB A1 PULLEY RELEASE    Medications prior to admission:   Prior to Admission medications    Medication Sig Start Date End Date Taking? Authorizing Provider   omeprazole 20 MG EC tablet Take 20 mg by mouth daily   Yes Historical Provider, MD   losartan (COZAAR) 50 MG tablet Take 50 mg by mouth daily   Yes Historical Provider, MD   fenofibric acid (FIBRICOR) 45 MG CPDR capsule Take 45 mg by mouth daily   Yes Historical Provider, MD   HYDROcodone-acetaminophen (NORCO) 5-325 MG per tablet Take 1 tablet by mouth 2 times daily. 10/12/21  Yes Historical Provider, MD   gabapentin (NEURONTIN) 300 MG capsule Take 300 mg by mouth 3 times daily.   10/22/21  Yes Historical Provider, MD   estrogens, conjugated, (PREMARIN) 0.3 MG tablet Take 1 tablet by mouth daily Indications: 0.3mg 10/26/21  Yes Kojo Leach MD   Cholecalciferol (VITAMIN D3) 5000 units TABS Take 1 tablet by mouth daily    Yes Historical Provider, MD   Escitalopram Oxalate (LEXAPRO PO) Take 10 mg by mouth daily    Yes Historical Provider, MD   ibuprofen-famotidine (DUEXIS) 800-26.6 MG TABS Take by mouth    Historical Provider, MD   valACYclovir (VALTREX) 500 MG tablet Take 1 tablet by mouth daily 10/26/21   Kojo Leach MD   almotriptan (AXERT) 12.5 MG tablet Take 12.5 mg by mouth once as needed for Migraine may repeat in 2 hours if needed    Historical Provider, MD   Rosuvastatin Calcium (CRESTOR PO) Take 10 mg by mouth daily     Historical Provider, MD       Current medications:    Current Facility-Administered Medications   Medication Dose Route Frequency Provider Last Rate Last Admin    ceFAZolin (ANCEF) 2000 mg in dextrose 4 % 100 mL IVPB (premix)  2,000 mg IntraVENous Once Amisha Bright MD        lactated ringers infusion   IntraVENous Continuous Amisha Bright  mL/hr at 21 0553 New Bag at 21 0553       Allergies:     Allergies   Allergen Reactions    Bactrim [Sulfamethoxazole-Trimethoprim] Shortness Of Breath       Problem List:    Patient Active Problem List   Diagnosis Code    Chest pressure R07.89       Past Medical History:        Diagnosis Date    Abnormal Pap smear of cervix     Carcinoma in situ     Chest pressure 10/29/2014    10/29/2014  SE  negative for myocardial ischemia [facility])      Depression     Genital herpes     Hyperlipidemia     Hypertension     Osteoarthritis     BACK       Past Surgical History:        Procedure Laterality Date    APPENDECTOMY      CYST REMOVAL      FROM LEGS    HAND SURGERY      trigger thumb    HYSTERECTOMY      MAYTE AND BSO Bilateral 12    age 27       Social History:    Social History     Tobacco Use    Smoking status: Former Smoker     Quit date: 2021     Years since quittin.0    Smokeless tobacco: Never Used    Tobacco comment: USING A PATCH on today    Substance Use Topics    Alcohol use: Not Currently                                Counseling given: Not Answered  Comment: USING A PATCH on today       Vital Signs (Current):   Vitals:    21 0543   BP: (!) 152/72   Pulse: 65   Resp: 12   Temp: 97.4 °F (36.3 °C)   TempSrc: Temporal   SpO2: 98%   Weight: 147 lb (66.7 kg)   Height: 5' 2\" (1.575 m)                                              BP Readings from Last 3 Encounters:   21 (!) 152/72   10/26/21 (!) 160/88   10/20/20 130/78       NPO Status: Time of last liquid consumption: 1800                        Time of last solid consumption: 1800                        Date of last liquid consumption: 21                        Date of last solid food consumption: 21    BMI:   Wt Readings from Last 3 Encounters:   21 147 lb (66.7 kg)   21 147 lb (66.7 kg)   10/26/21 152 lb (68.9 kg)     Body mass index is 26.89 kg/m². CBC:   Lab Results   Component Value Date    WBC 9.4 2021    RBC 4.79 2021    HGB 15.4 2021    HCT 47.6 2021    MCV 99.4 2021    RDW 12.4 2021     2021       CMP:   Lab Results   Component Value Date     2021    K 3.7 2021     2021    CO2 21 2021    BUN 18 2021    CREATININE 0.7 2021    GFRAA >59 2021    LABGLOM >60 2021    GLUCOSE 113 2021    CALCIUM 9.8 2021       POC Tests: No results for input(s): POCGLU, POCNA, POCK, POCCL, POCBUN, POCHEMO, POCHCT in the last 72 hours. Coags: No results found for: PROTIME, INR, APTT    HCG (If Applicable): No results found for: PREGTESTUR, PREGSERUM, HCG, HCGQUANT     ABGs: No results found for: PHART, PO2ART, WDI6HWH, FTW2FSX, BEART, I6SGEVXK     Type & Screen (If Applicable):  No results found for: LABABO, LABRH    Drug/Infectious Status (If Applicable):  No results found for: HIV, HEPCAB    COVID-19 Screening (If Applicable): No results found for: COVID19        Anesthesia Evaluation  Patient summary reviewed and Nursing notes reviewed no history of anesthetic complications:   Airway: Mallampati: II  TM distance: >3 FB   Neck ROM: full  Mouth opening: > = 3 FB Dental: normal exam         Pulmonary:   (+) current smoker (pt currently working on quitting)    (-) asthma, shortness of breath and sleep apnea          Patient did not smoke on day of surgery.                  Cardiovascular:  Exercise tolerance: good (>4 METS),   (+) hypertension:, hyperlipidemia    (-) pacemaker, past MI, CAD, CABG/stent, dysrhythmias and  angina    ECG reviewed               Beta Blocker:  Not on Beta Blocker      ROS comment: EK BPM  Sinus rhythm  Anterior T wave abnormality is nonspecific  Low QRS voltages in precordial leads  Comparison Summary: No serial comparison made  Summary:

## 2022-11-01 ENCOUNTER — OFFICE VISIT (OUTPATIENT)
Dept: OBGYN CLINIC | Age: 62
End: 2022-11-01
Payer: COMMERCIAL

## 2022-11-01 VITALS
DIASTOLIC BLOOD PRESSURE: 80 MMHG | HEART RATE: 78 BPM | SYSTOLIC BLOOD PRESSURE: 128 MMHG | WEIGHT: 150 LBS | BODY MASS INDEX: 27.6 KG/M2 | HEIGHT: 62 IN

## 2022-11-01 DIAGNOSIS — Z01.419 WOMEN'S ANNUAL ROUTINE GYNECOLOGICAL EXAMINATION: Primary | ICD-10-CM

## 2022-11-01 DIAGNOSIS — Z12.31 ENCOUNTER FOR SCREENING MAMMOGRAM FOR MALIGNANT NEOPLASM OF BREAST: ICD-10-CM

## 2022-11-01 DIAGNOSIS — B00.9 HSV INFECTION: ICD-10-CM

## 2022-11-01 DIAGNOSIS — Z79.890 HORMONE REPLACEMENT THERAPY (HRT): ICD-10-CM

## 2022-11-01 DIAGNOSIS — Z78.0 POSTMENOPAUSAL: ICD-10-CM

## 2022-11-01 PROCEDURE — 99396 PREV VISIT EST AGE 40-64: CPT | Performed by: OBSTETRICS & GYNECOLOGY

## 2022-11-01 RX ORDER — HYDROCODONE BITARTRATE AND ACETAMINOPHEN 7.5; 325 MG/1; MG/1
1 TABLET ORAL EVERY 6 HOURS PRN
COMMUNITY

## 2022-11-01 RX ORDER — VALACYCLOVIR HYDROCHLORIDE 500 MG/1
500 TABLET, FILM COATED ORAL DAILY
Qty: 90 TABLET | Refills: 3 | Status: SHIPPED | OUTPATIENT
Start: 2022-11-01 | End: 2022-11-01 | Stop reason: SDUPTHER

## 2022-11-01 RX ORDER — VALACYCLOVIR HYDROCHLORIDE 500 MG/1
500 TABLET, FILM COATED ORAL DAILY
Qty: 90 TABLET | Refills: 3 | Status: SHIPPED | OUTPATIENT
Start: 2022-11-01

## 2022-11-01 ASSESSMENT — ENCOUNTER SYMPTOMS
GASTROINTESTINAL NEGATIVE: 1
EYES NEGATIVE: 1
RESPIRATORY NEGATIVE: 1

## 2022-11-01 NOTE — PROGRESS NOTES
I, Zulema Son RN, am scribing for and in the presence of Dr. Sharon Sevilla 2022/10:53 AM/sign         2022    Jack Coleman (:  1960) is a 60 y.o. female, here for a preventive medicine evaluation. She had a hysterectomy and BSO in  due to carcinoma in Situ of cervix. Her pap smears have been normal since hysterectomy. She denies any issues. Needing Premarin and Valtrex refills. Last Dexa was in  and mammogram was in 2021. Patient Active Problem List   Diagnosis    Chest pressure       Review of Systems   Constitutional: Negative. HENT: Negative. Eyes: Negative. Respiratory: Negative. Cardiovascular: Negative. Gastrointestinal: Negative. Genitourinary:  Negative for difficulty urinating, dyspareunia, dysuria, enuresis, frequency, hematuria, menstrual problem, pelvic pain, urgency and vaginal discharge. Musculoskeletal: Negative. Skin: Negative. Neurological: Negative. Psychiatric/Behavioral: Negative. Prior to Visit Medications    Medication Sig Taking? Authorizing Provider   HYDROcodone-acetaminophen (NORCO) 7.5-325 MG per tablet Take 1 tablet by mouth every 6 hours as needed for Pain. Yes Historical Provider, MD   estrogens, conjugated, (PREMARIN) 0.3 MG tablet Take 1 tablet by mouth daily Indications: 0.3mg Yes Lori Olivarez MD   valACYclovir (VALTREX) 500 MG tablet Take 1 tablet by mouth daily Yes Lori Olivarez MD   omeprazole 20 MG EC tablet Take 20 mg by mouth daily Yes Historical Provider, MD   losartan (COZAAR) 50 MG tablet Take 50 mg by mouth daily Yes Historical Provider, MD   fenofibric acid (FIBRICOR) 45 MG CPDR capsule Take 45 mg by mouth daily Yes Historical Provider, MD   gabapentin (NEURONTIN) 300 MG capsule Take 300 mg by mouth 3 times daily.   Yes Historical Provider, MD   almotriptan (AXERT) 12.5 MG tablet Take 12.5 mg by mouth once as needed for Migraine may repeat in 2 hours if needed Yes Historical Provider, MD Cholecalciferol (VITAMIN D3) 5000 units TABS Take 1 tablet by mouth daily  Yes Historical Provider, MD   Escitalopram Oxalate (LEXAPRO PO) Take 10 mg by mouth daily  Yes Historical Provider, MD   Rosuvastatin Calcium (CRESTOR PO) Take 10 mg by mouth daily  Yes Historical Provider, MD        Allergies   Allergen Reactions    Bactrim [Sulfamethoxazole-Trimethoprim] Shortness Of Breath       Past Medical History:   Diagnosis Date    Abnormal Pap smear of cervix     Carcinoma in situ     Chest pressure 10/29/2014    10/29/2014  SE  negative for myocardial ischemia [facility])      Depression     Genital herpes     Hyperlipidemia     Hypertension     Osteoarthritis     BACK       Past Surgical History:   Procedure Laterality Date    APPENDECTOMY      CYST REMOVAL      FROM LEGS    FINGER TRIGGER RELEASE Left 2021    LEFT THUMB A1 PULLEY RELEASE performed by Jasbir Barlow MD at [address] [place] [address]      trigger thumb    HYSTERECTOMY (CERVIX STATUS UNKNOWN)      MAYTE AND BSO (CERVIX REMOVED) Bilateral     age 27       Social History     Socioeconomic History    Marital status:      Spouse name: Not on file    Number of children: Not on file    Years of education: Not on file    Highest education level: Not on file   Occupational History    Not on file   Tobacco Use    Smoking status: Former     Types: Cigarettes     Quit date: 2021     Years since quittin.9    Smokeless tobacco: Never    Tobacco comments:     USING A PATCH on today    Vaping Use    Vaping Use: Never used   Substance and Sexual Activity    Alcohol use: Not Currently    Drug use: Never    Sexual activity: Not Currently   Other Topics Concern    Not on file   Social History Narrative    Not on file     Social Determinants of Health     Financial Resource Strain: Not on file   Food Insecurity: Not on file   Transportation Needs: Not on file   Physical Activity: Not on file   Stress: Not on file   Social Connections: Not on file   Intimate Partner Violence: Not on file   Housing Stability: Not on file        Family History   Problem Relation Age of Onset    Cancer Maternal Grandmother     Diabetes Father     Eclampsia Father     Heart Failure Father     Heart Disease Father     Eclampsia Mother     Stroke Mother     Cancer Sister        ADVANCE DIRECTIVE: N, <no information>    Vitals:    11/01/22 0958   BP: 128/80   Site: Left Upper Arm   Position: Sitting   Cuff Size: Medium Adult   Pulse: 78   Weight: 150 lb (68 kg)   Height: 5' 2\" (1.575 m)     Estimated body mass index is 27.44 kg/m² as calculated from the following:    Height as of this encounter: 5' 2\" (1.575 m). Weight as of this encounter: 150 lb (68 kg). Physical Exam  Constitutional:       Appearance: She is well-developed. HENT:      Head: Normocephalic and atraumatic. Right Ear: Hearing normal.      Left Ear: Hearing normal.      Nose: Nose normal.   Eyes:      General: Lids are normal.      Conjunctiva/sclera: Conjunctivae normal.      Pupils: Pupils are equal, round, and reactive to light. Cardiovascular:      Rate and Rhythm: Normal rate and regular rhythm. Heart sounds: No murmur heard. No friction rub. No gallop. Pulmonary:      Effort: Pulmonary effort is normal.      Breath sounds: Normal breath sounds. Chest:   Breasts:     Breasts are symmetrical.      Right: No inverted nipple, mass, nipple discharge, skin change or tenderness. Left: No inverted nipple, mass, nipple discharge, skin change or tenderness. Abdominal:      General: Bowel sounds are normal. There is no distension. Palpations: Abdomen is soft. There is no mass. Tenderness: There is no abdominal tenderness. Genitourinary:     General: Normal vulva. Pubic Area: No rash or pubic lice. Labia:         Right: No rash, tenderness or lesion. Left: No rash, tenderness or lesion.        Urethra: No prolapse, urethral pain, urethral swelling or urethral lesion. Vagina: Normal. No vaginal discharge or bleeding. Rectum: Normal. No anal fissure or external hemorrhoid. Comments: Labia are atrophic. Cervix, uterus, and adnexa surgically absent. Bladder non tender, no masses Vaginal cuff intact. Specimen for vaginal cuff cytology obtained. Musculoskeletal:         General: Normal range of motion. Cervical back: Normal range of motion and neck supple. Comments: Normal ROM in all four extremities; normal gait   Lymphadenopathy:      Cervical: No cervical adenopathy. Skin:     General: Skin is warm and dry. Findings: No rash. Neurological:      Mental Status: She is alert and oriented to person, place, and time. Psychiatric:         Speech: Speech normal.         Behavior: Behavior normal.       No flowsheet data found.     Lab Results   Component Value Date/Time    GLUCOSE 113 12/13/2021 10:20 AM       The ASCVD Risk score (Sol MONTEMAYOR, et al., 2019) failed to calculate for the following reasons:    Cannot find a previous HDL lab    Cannot find a previous total cholesterol lab    Immunization History   Administered Date(s) Administered    COVID-19, MODERNA BLUE border, Primary or Immunocompromised, (age 12y+), IM, 100 mcg/0.5mL 03/18/2021, 04/15/2021    COVID-19, MODERNA Booster BLUE border, (age 18y+), IM, 50mcg/0.25mL 11/30/2021       Health Maintenance   Topic Date Due    Lipids  Never done    Depression Screen  Never done    HIV screen  Never done    Hepatitis C screen  Never done    DTaP/Tdap/Td vaccine (1 - Tdap) Never done    Diabetes screen  Never done    Shingles vaccine (1 of 2) Never done    COVID-19 Vaccine (4 - Booster for Moderna series) 01/25/2022    Flu vaccine (1) Never done    Breast cancer screen  11/02/2023    Colorectal Cancer Screen  10/07/2031    Hepatitis A vaccine  Aged Out    Hib vaccine  Aged Out    Meningococcal (ACWY) vaccine  Aged Out    Pneumococcal 0-64 years Vaccine  Aged Out       Assessment & Plan   Women's annual routine gynecological examination  Encounter for screening mammogram for malignant neoplasm of breast  -     KHOA DIGITAL SCREEN W OR WO CAD BILATERAL; Future  HSV infection  -     valACYclovir (VALTREX) 500 MG tablet; Take 1 tablet by mouth daily, Disp-90 tablet, R-3Normal  Hormone replacement therapy (HRT)  -     estrogens, conjugated, (PREMARIN) 0.3 MG tablet; Take 1 tablet by mouth daily Indications: 0.3mg, Disp-90 tablet, R-3Normal  Postmenopausal  -     DEXA BONE DENSITY AXIAL SKELETON; Future    Medications refilled  Mammogram and DEXA ordered and pt will schedule    Return in about 1 year (around 11/1/2023), or if symptoms worsen or fail to improve. Mira Denny MD, personally performed services described in this document as scribed by Louie Britton RN in my presence, and it is both accurate and complete.

## 2022-11-01 NOTE — PATIENT INSTRUCTIONS
Patient Education        Breast Self-Exam: Care Instructions  Your Care Instructions     A breast self-exam is when you check your breasts for lumps or changes. This regular exam helps you learn how your breasts normally look and feel. Most breast problems or changes are not because of cancer. Breast self-exam is not a substitute for a mammogram. Having regular breast exams by your doctor and regular mammograms improve your chances of finding any problems with your breasts. Some women set a time each month to do a step-by-step breast self-exam. Other women like a less formal system. They might look at their breasts as they brush their teeth, or feel their breasts once in a while in the shower. If you notice a change in your breast, tell your doctor. Follow-up care is a key part of your treatment and safety. Be sure to make and go to all appointments, and call your doctor if you are having problems. It's also a good idea to know your test results and keep a list of the medicines you take. How do you do a breast self-exam?  The best time to examine your breasts is usually one week after your menstrual period begins. Your breasts should not be tender then. If you do not have periods, you might do your exam on a day of the month that is easy to remember. To examine your breasts:  Remove all your clothes above the waist and lie down. When you are lying down, your breast tissue spreads evenly over your chest wall, which makes it easier to feel all your breast tissue. Use the pads--not the fingertips--of the 3 middle fingers of your left hand to check your right breast. Move your fingers slowly in small coin-sized circles that overlap. Use three levels of pressure to feel of all your breast tissue. Use light pressure to feel the tissue close to the skin surface. Use medium pressure to feel a little deeper. Use firm pressure to feel your tissue close to your breastbone and ribs.  Use each pressure level to feel your breast tissue before moving on to the next spot. Check your entire breast, moving up and down as if following a strip from the collarbone to the bra line, and from the armpit to the ribs. Repeat until you have covered the entire breast.  Repeat this procedure for your left breast, using the pads of the 3 middle fingers of your right hand. To examine your breasts while in the shower:  Place one arm over your head and lightly soap your breast on that side. Using the pads of your fingers, gently move your hand over your breast (in the strip pattern described above), feeling carefully for any lumps or changes. Repeat for the other breast.  Have your doctor inspect anything you notice to see if you need further testing. Where can you learn more? Go to https://[employer]. org and sign in to your [employer] account. Enter P148 in the [employer] box to learn more about \"Breast Self-Exam: Care Instructions. \"     If you do not have an account, please click on the \"Sign Up Now\" link. Current as of: November 22, 2021               Content Version: 13.4  © 1112-1890 Healthwise, Incorporated. Care instructions adapted under license by [facility] ([facility]). If you have questions about a medical condition or this instruction, always ask your healthcare professional. [address]  any warranty or liability for your use of this information.

## 2022-12-30 ENCOUNTER — HOSPITAL ENCOUNTER (OUTPATIENT)
Dept: WOMENS IMAGING | Age: 62
Discharge: HOME | End: 2022-12-30
Payer: COMMERCIAL

## 2022-12-30 VITALS — WEIGHT: 145 LBS | BODY MASS INDEX: 26.52 KG/M2

## 2022-12-30 DIAGNOSIS — Z78.0 POSTMENOPAUSAL: ICD-10-CM

## 2022-12-30 DIAGNOSIS — Z12.31 ENCOUNTER FOR SCREENING MAMMOGRAM FOR MALIGNANT NEOPLASM OF BREAST: ICD-10-CM

## 2022-12-30 PROCEDURE — 77080 DXA BONE DENSITY AXIAL: CPT

## 2022-12-30 PROCEDURE — 77067 SCR MAMMO BI INCL CAD: CPT

## 2023-01-03 ENCOUNTER — PATIENT MESSAGE (OUTPATIENT)
Dept: OBGYN CLINIC | Age: 63
End: 2023-01-03

## 2023-01-04 RX ORDER — ALENDRONATE SODIUM 35 MG/1
35 TABLET ORAL
Qty: 4 TABLET | Refills: 3 | Status: SHIPPED | OUTPATIENT
Start: 2023-01-04

## 2023-01-04 NOTE — TELEPHONE ENCOUNTER
From: Bozena Grove  To: Dr. Varma Rule  Sent: 1/3/2023 12:30 PM CST  Subject: Bone Density Test Results     I see that I have osteopenia in my neck. Should I be put on medication for this? I took medicine in the past for osteopenia and it reversed back to normal after two years.     Thank you,  Alexandra Bear

## 2023-02-27 DIAGNOSIS — M19.90 OTHER TYPE OF OSTEOARTHRITIS, UNSPECIFIED SITE: Primary | ICD-10-CM

## 2023-02-27 RX ORDER — RISEDRONATE SODIUM 35 MG/1
35 TABLET, FILM COATED ORAL
Qty: 4 TABLET | Refills: 3 | Status: SHIPPED | OUTPATIENT
Start: 2023-02-27

## 2023-03-28 ENCOUNTER — HOSPITAL ENCOUNTER (OUTPATIENT)
Dept: CT IMAGING | Age: 63
Discharge: HOME | End: 2023-03-28
Payer: COMMERCIAL

## 2023-03-28 DIAGNOSIS — Z12.2 SCREENING FOR LUNG CANCER: ICD-10-CM

## 2023-03-28 PROCEDURE — 71271 CT THORAX LUNG CANCER SCR C-: CPT

## 2023-05-10 ENCOUNTER — OFFICE VISIT (OUTPATIENT)
Age: 63
End: 2023-05-10
Payer: COMMERCIAL

## 2023-05-10 VITALS
HEART RATE: 82 BPM | HEIGHT: 62 IN | OXYGEN SATURATION: 98 % | SYSTOLIC BLOOD PRESSURE: 132 MMHG | WEIGHT: 145 LBS | BODY MASS INDEX: 26.68 KG/M2 | DIASTOLIC BLOOD PRESSURE: 80 MMHG | TEMPERATURE: 98.1 F | RESPIRATION RATE: 20 BRPM

## 2023-05-10 DIAGNOSIS — B34.9 VIRAL ILLNESS: Primary | ICD-10-CM

## 2023-05-10 DIAGNOSIS — J02.9 SORE THROAT: ICD-10-CM

## 2023-05-10 DIAGNOSIS — H92.02 OTALGIA, LEFT EAR: ICD-10-CM

## 2023-05-10 DIAGNOSIS — R05.1 ACUTE COUGH: ICD-10-CM

## 2023-05-10 LAB — S PYO AG THROAT QL: NORMAL

## 2023-05-10 PROCEDURE — 99213 OFFICE O/P EST LOW 20 MIN: CPT | Performed by: NURSE PRACTITIONER

## 2023-05-10 PROCEDURE — 87880 STREP A ASSAY W/OPTIC: CPT | Performed by: NURSE PRACTITIONER

## 2023-05-10 ASSESSMENT — ENCOUNTER SYMPTOMS
SORE THROAT: 1
ALLERGIC/IMMUNOLOGIC NEGATIVE: 1
WHEEZING: 0
COUGH: 1
EYES NEGATIVE: 1
GASTROINTESTINAL NEGATIVE: 1
SHORTNESS OF BREATH: 0

## 2023-05-10 NOTE — PATIENT INSTRUCTIONS
Tylenol or Motrin for fever or pain as needed. Rest and increase fluid intake. Coolmist humidifier. Start Claritin or Zyrtec daily. Start Flonase daily. Gargle with warm salt water several times daily for sore throat. SEEK IMMEDIATE MEDICAL CARE IF:  You have shortness of breath, difficulty swallowing, or have trouble breathing. You are dizzy or you faint. You are disoriented or confused. You develop signs of dehydration, such as a dry mouth, decreased urination, or paleness. You develop severe pain in your abdomen. You have persistent vomiting or diarrhea. You develop a skin rash. Your symptoms suddenly get worse. Follow up with your PCP if symptoms do not improve or worsen.

## 2023-05-10 NOTE — PROGRESS NOTES
Chary Kinney (:  1960) is a 60 y.o. female,Established patient, here for evaluation of the following chief complaint(s):  Pharyngitis (Started  monday), Cough, and Ear Drainage (Left since yesterday )    Patient presents today complaining of sore throat that began 2 days ago, and cough, and left ear pain that began  yesterday. Denies fever, GI symptoms, wheezing, body aches, chills. Explained to patient that her rapid strep was negative. Likely viral.Care instructions discussed. Patient verbalized understanding and agrees to plan of care. ASSESSMENT/PLAN:  1. Viral illness  2. Sore throat  -     POCT rapid strep A  3. Acute cough  4. Otalgia, left ear     No orders of the defined types were placed in this encounter. Return if symptoms worsen or fail to improve. Subjective   SUBJECTIVE/OBJECTIVE:  HPI    Review of Systems   Constitutional: Negative. HENT:  Positive for ear pain, postnasal drip and sore throat. Negative for ear discharge. Eyes: Negative. Respiratory:  Positive for cough. Negative for shortness of breath and wheezing. Cardiovascular: Negative. Gastrointestinal: Negative. Endocrine: Negative. Genitourinary: Negative. Musculoskeletal: Negative. Skin: Negative. Allergic/Immunologic: Negative. Neurological: Negative. Hematological: Negative. Psychiatric/Behavioral: Negative. Objective   Physical Exam  Vitals reviewed. HENT:      Right Ear: Tympanic membrane, ear canal and external ear normal.      Left Ear: Tympanic membrane, ear canal and external ear normal.      Nose:      Right Sinus: No maxillary sinus tenderness or frontal sinus tenderness. Left Sinus: No maxillary sinus tenderness or frontal sinus tenderness. Mouth/Throat:      Lips: Pink. Mouth: Mucous membranes are moist.      Pharynx: No oropharyngeal exudate or posterior oropharyngeal erythema (postnasal drainage).    Cardiovascular:      Rate

## 2023-05-22 DIAGNOSIS — M85.80 OSTEOPENIA AFTER MENOPAUSE: ICD-10-CM

## 2023-05-22 DIAGNOSIS — Z78.0 POSTMENOPAUSAL: Primary | ICD-10-CM

## 2023-05-22 DIAGNOSIS — Z78.0 OSTEOPENIA AFTER MENOPAUSE: ICD-10-CM

## 2023-05-22 RX ORDER — ALENDRONATE SODIUM 70 MG/1
70 TABLET ORAL
Qty: 12 TABLET | Refills: 3 | Status: SHIPPED | OUTPATIENT
Start: 2023-05-22 | End: 2023-08-20

## 2023-10-31 ASSESSMENT — PATIENT HEALTH QUESTIONNAIRE - PHQ9
1. LITTLE INTEREST OR PLEASURE IN DOING THINGS: NOT AT ALL
SUM OF ALL RESPONSES TO PHQ9 QUESTIONS 1 & 2: 0
SUM OF ALL RESPONSES TO PHQ9 QUESTIONS 1 & 2: 0
SUM OF ALL RESPONSES TO PHQ QUESTIONS 1-9: 0
SUM OF ALL RESPONSES TO PHQ QUESTIONS 1-9: 0
2. FEELING DOWN, DEPRESSED OR HOPELESS: 0
SUM OF ALL RESPONSES TO PHQ QUESTIONS 1-9: 0
SUM OF ALL RESPONSES TO PHQ QUESTIONS 1-9: 0
1. LITTLE INTEREST OR PLEASURE IN DOING THINGS: 0
2. FEELING DOWN, DEPRESSED OR HOPELESS: NOT AT ALL

## 2023-11-03 ENCOUNTER — OFFICE VISIT (OUTPATIENT)
Dept: OBGYN CLINIC | Age: 63
End: 2023-11-03
Payer: COMMERCIAL

## 2023-11-03 VITALS
HEIGHT: 62 IN | HEART RATE: 72 BPM | WEIGHT: 155 LBS | SYSTOLIC BLOOD PRESSURE: 128 MMHG | BODY MASS INDEX: 28.52 KG/M2 | DIASTOLIC BLOOD PRESSURE: 84 MMHG

## 2023-11-03 DIAGNOSIS — B00.9 HSV INFECTION: ICD-10-CM

## 2023-11-03 DIAGNOSIS — Z79.890 HORMONE REPLACEMENT THERAPY (HRT): ICD-10-CM

## 2023-11-03 DIAGNOSIS — Z72.820 POOR SLEEP: ICD-10-CM

## 2023-11-03 DIAGNOSIS — R33.9 URINARY RETENTION: ICD-10-CM

## 2023-11-03 DIAGNOSIS — Z01.419 ENCOUNTER FOR WELL WOMAN EXAM WITH ROUTINE GYNECOLOGICAL EXAM: Primary | ICD-10-CM

## 2023-11-03 DIAGNOSIS — Z90.710 HISTORY OF HYSTERECTOMY FOR CANCER: ICD-10-CM

## 2023-11-03 DIAGNOSIS — Z12.39 SCREENING BREAST EXAMINATION: ICD-10-CM

## 2023-11-03 DIAGNOSIS — Z12.31 BREAST CANCER SCREENING BY MAMMOGRAM: ICD-10-CM

## 2023-11-03 LAB
BILIRUB UR QL STRIP: NEGATIVE
CLARITY UR: CLEAR
COLOR UR: YELLOW
GLUCOSE UR STRIP.AUTO-MCNC: NEGATIVE MG/DL
HGB UR STRIP.AUTO-MCNC: NEGATIVE MG/L
KETONES UR STRIP.AUTO-MCNC: NEGATIVE MG/DL
LEUKOCYTE ESTERASE UR QL STRIP.AUTO: NEGATIVE
NITRITE UR QL STRIP.AUTO: NEGATIVE
PH UR STRIP.AUTO: 6 [PH] (ref 5–8)
PROT UR STRIP.AUTO-MCNC: NEGATIVE MG/DL
SP GR UR STRIP.AUTO: 1.02 (ref 1–1.03)
UROBILINOGEN UR STRIP.AUTO-MCNC: 1 E.U./DL

## 2023-11-03 PROCEDURE — 99396 PREV VISIT EST AGE 40-64: CPT

## 2023-11-03 RX ORDER — VALACYCLOVIR HYDROCHLORIDE 500 MG/1
500 TABLET, FILM COATED ORAL DAILY
Qty: 90 TABLET | Refills: 3 | Status: SHIPPED | OUTPATIENT
Start: 2023-11-03

## 2023-11-03 NOTE — PROGRESS NOTES
Pt presents today for pap smear and breast exam.  She also complains of     Last mammogram:  2022  Last pap smear:  10/01/2019  Contraception:  hyst in   :  0  Para:  0  AB:    Last bone density:  2022  Last colonoscopy:      Needs refill on premarin and valtrex.  Needs them printed because she gets them in [facility] for way less cost.
Breasts symmetrical without tenderness, masses, or nipple discharge. Nipples everted bilaterally. Abdominal:      Palpations: Abdomen is soft. Genitourinary:     Vagina: Normal.      Comments: Uterus: surgically absent  Cervix: surgically absent  Adnexa: surgically absent  Musculoskeletal:      Cervical back: Normal range of motion and neck supple. Skin:     General: Skin is warm and dry. Neurological:      Mental Status: She is alert and oriented to person, place, and time. MEDICATIONS:  Orders Placed This Encounter   Medications    estrogens, conjugated, (PREMARIN) 0.3 MG tablet     Sig: Take 1 tablet by mouth daily Indications: 0.3mg     Dispense:  90 tablet     Refill:  3    valACYclovir (VALTREX) 500 MG tablet     Sig: Take 1 tablet by mouth daily     Dispense:  90 tablet     Refill:  3       ORDERS:  Orders Placed This Encounter   Procedures    Culture, Urine    Urinalysis       Plan:  WWE - no pap indicated, self breast exam educated and encouraged, clinical breast exam performed. Mammogram ordered.     Refilled estrogen and valtrex  Encouraged patient to use OTC magnesium glycinate 400-500mg/night for sleep   Return in 1 year for WWE and PRN

## 2023-11-05 LAB — BACTERIA UR CULT: NORMAL

## 2023-11-06 ASSESSMENT — ENCOUNTER SYMPTOMS
BACK PAIN: 0
GASTROINTESTINAL NEGATIVE: 1
ABDOMINAL PAIN: 0
NAUSEA: 0
SHORTNESS OF BREATH: 0
RECTAL PAIN: 0
DIARRHEA: 0
CHEST TIGHTNESS: 0
RESPIRATORY NEGATIVE: 1
CONSTIPATION: 0

## 2024-01-02 ENCOUNTER — HOSPITAL ENCOUNTER (OUTPATIENT)
Dept: WOMENS IMAGING | Age: 64
Discharge: HOME | End: 2024-01-02
Payer: COMMERCIAL

## 2024-01-02 DIAGNOSIS — Z12.31 BREAST CANCER SCREENING BY MAMMOGRAM: ICD-10-CM

## 2024-01-02 PROCEDURE — 77063 BREAST TOMOSYNTHESIS BI: CPT

## 2024-03-18 NOTE — PROGRESS NOTES
Physical Therapy Treatment and 30 Day Progress Note    Patient:           Debbie Lujan            : 1960  Today's Date: 2021  Referring practitioner:    JIM Franco  Date of Initial Visit:          Type: THERAPY  Noted: 2020    Patient seen for 7 sessions    Visit Diagnoses:    ICD-10-CM ICD-9-CM   1. Lumbar back pain  M54.5 724.2     SUBJECTIVE     Subjective   She has still had no symptoms into her legs. She feels drastically better than when we started.    PAIN: 1/10         OBJECTIVE     Objective   Manual Therapy     23255  Comments   Prone STM to thoracolumbar nathalia and gluts with foam                            Timed Minutes 15            Therapeutic Exercises    52600 Comments   Sitting on 55cm physioball:    Hip rotations; CW then CCW Less coordinated going CW   Ant/post pelvic tilts Felt good to her- slow and controlled   LAQ alternating legs More difficult to keep balance on ball   Scapular retraction yellow tband I held band for her to pull against   Timed Minutes 30       Therapy Education/Self Care 64263   Details: Core activation   [facility]: n/a   Given postural retraining   Progress: Progressed   Who provided to: Patient   Level of understanding   Verbalized   Timed Minutes      Total Timed Treatment:     45   mins  Total Time of Visit:            45 mins         ASSESSMENT/PLAN     Assessment/Plan     ASSESSMENT: She continues to feel much better since starting PT. She is able to get into quadruped to help her  put in new isacc without any lasting increase in back discomfort. She takes frequent breaks, and understands the importance of changing positions. She did well on the ball today and could tell her core was working.     PLAN: She has 1 more approved visit. Will let her go for a couple of weeks with her HEP on her own and then have her come back in. If she has further needs, we will ask for more visits.           Goals   STG by: 21 Comments Status    1. Patient will report decreased feeling of tightness in back and legs.  Met   2. Patient will be able to SLS on each leg >15 seconds with no LOB.   ongoing   3. Patient will be able to complete an hour of light housework without increased pain or severe fatigue.    Met          LTG by: 2/17/21       1. Patient will be able to use the vacuum without increased pain.  has not tried ongoing   2. Patient will be able to  light items from the floor without increased pain.  increases pain ongoing   3. Patient will have improved score on Modified Oswestry by at least 5 points.   down 5 points as of today Partially met   4. Patient will be independent with comprehensive HEP.  compliant ongoing   5. Patient will report no further radicular symptoms into the legs.    Met             Radha Dykes, PT, DPT, CLT-CHANEL  Physical Therapist   no

## 2024-11-08 ENCOUNTER — OFFICE VISIT (OUTPATIENT)
Dept: OBGYN CLINIC | Age: 64
End: 2024-11-08

## 2024-11-08 VITALS
SYSTOLIC BLOOD PRESSURE: 102 MMHG | WEIGHT: 152 LBS | BODY MASS INDEX: 27.97 KG/M2 | HEART RATE: 78 BPM | HEIGHT: 62 IN | DIASTOLIC BLOOD PRESSURE: 69 MMHG

## 2024-11-08 DIAGNOSIS — B00.9 HSV INFECTION: ICD-10-CM

## 2024-11-08 DIAGNOSIS — Z12.31 SCREENING MAMMOGRAM FOR BREAST CANCER: ICD-10-CM

## 2024-11-08 DIAGNOSIS — Z01.419 WELL WOMAN EXAM: Primary | ICD-10-CM

## 2024-11-08 DIAGNOSIS — Z79.890 PREMATURE SURGICAL MENOPAUSE ON HRT: ICD-10-CM

## 2024-11-08 DIAGNOSIS — Z12.39 SCREENING BREAST EXAMINATION: ICD-10-CM

## 2024-11-08 DIAGNOSIS — Z76.0 ENCOUNTER FOR MEDICATION REFILL: ICD-10-CM

## 2024-11-08 DIAGNOSIS — Z79.890 HORMONE REPLACEMENT THERAPY (HRT): ICD-10-CM

## 2024-11-08 DIAGNOSIS — E89.40 PREMATURE SURGICAL MENOPAUSE ON HRT: ICD-10-CM

## 2024-11-08 DIAGNOSIS — Z13.820 SCREENING FOR OSTEOPOROSIS: ICD-10-CM

## 2024-11-08 RX ORDER — VALACYCLOVIR HYDROCHLORIDE 500 MG/1
500 TABLET, FILM COATED ORAL DAILY
Qty: 90 TABLET | Refills: 3 | Status: SHIPPED | OUTPATIENT
Start: 2024-11-08

## 2024-11-08 ASSESSMENT — ENCOUNTER SYMPTOMS
BACK PAIN: 0
ABDOMINAL PAIN: 0
CHEST TIGHTNESS: 0
DIARRHEA: 0
CONSTIPATION: 0
NAUSEA: 0
RECTAL PAIN: 0
SHORTNESS OF BREATH: 0
GASTROINTESTINAL NEGATIVE: 1
RESPIRATORY NEGATIVE: 1

## 2024-11-08 NOTE — PROGRESS NOTES
Pt presents today for pelvic and breast exam.  Pt needs paper copies of prescription of premarin and valtrex, [place].     Last mammogram:  2024  Last pap smear:   negatvive   Contraception:  hyst  :0    Para:  0  AB:  0  Last bone density:  2022  Last colonoscopy: never   
minutes before the first food, beverage, or medication. Stay upright (do not lie down) for at least 30 minutes. Do not crush or break. 12 tablet 3     No current facility-administered medications for this visit.     Allergies   Allergen Reactions    Bactrim [Sulfamethoxazole-Trimethoprim] Shortness Of Breath     Vitals:    11/08/24 1103   BP: 102/69   Pulse: 78   Weight: 68.9 kg (152 lb)   Height: 1.575 m (5' 2\")     Body mass index is 27.8 kg/m².    Review of Systems   Constitutional: Negative.  Negative for activity change, fever and unexpected weight change.   Respiratory: Negative.  Negative for chest tightness and shortness of breath.    Cardiovascular: Negative.  Negative for chest pain.   Gastrointestinal: Negative.  Negative for abdominal pain, constipation, diarrhea, nausea and rectal pain.   Genitourinary:  Negative for difficulty urinating, dyspareunia, frequency, genital sores, menstrual problem, pelvic pain, vaginal discharge and vaginal pain.   Musculoskeletal: Negative.  Negative for back pain and gait problem.   Skin: Negative.    Neurological: Negative.  Negative for dizziness and headaches.   Psychiatric/Behavioral: Negative.  Negative for behavioral problems, self-injury and suicidal ideas. The patient is not nervous/anxious.        Physical Exam  Vitals and nursing note reviewed.   Constitutional:       Appearance: She is well-developed.   HENT:      Head: Normocephalic and atraumatic.   Eyes:      Conjunctiva/sclera: Conjunctivae normal.      Pupils: Pupils are equal, round, and reactive to light.   Cardiovascular:      Rate and Rhythm: Normal rate and regular rhythm.      Heart sounds: Normal heart sounds.   Pulmonary:      Effort: Pulmonary effort is normal.   Chest:      Comments: Breasts symmetrical without tenderness, masses, or nipple discharge. Nipples everted bilaterally.    Abdominal:      Palpations: Abdomen is soft.   Genitourinary:     General: Normal vulva.      Exam position:

## 2025-01-03 ENCOUNTER — HOSPITAL ENCOUNTER (OUTPATIENT)
Dept: WOMENS IMAGING | Age: 65
Discharge: HOME | End: 2025-01-03
Payer: MEDICARE

## 2025-01-03 DIAGNOSIS — Z12.31 SCREENING MAMMOGRAM FOR BREAST CANCER: ICD-10-CM

## 2025-01-03 DIAGNOSIS — E89.40 PREMATURE SURGICAL MENOPAUSE ON HRT: ICD-10-CM

## 2025-01-03 DIAGNOSIS — Z79.890 PREMATURE SURGICAL MENOPAUSE ON HRT: ICD-10-CM

## 2025-01-03 DIAGNOSIS — Z13.820 SCREENING FOR OSTEOPOROSIS: ICD-10-CM

## 2025-01-03 PROCEDURE — 77063 BREAST TOMOSYNTHESIS BI: CPT

## 2025-01-03 PROCEDURE — 77080 DXA BONE DENSITY AXIAL: CPT

## 2025-06-13 ENCOUNTER — TRANSCRIBE ORDERS (OUTPATIENT)
Dept: ADMINISTRATIVE | Age: 65
End: 2025-06-13

## 2025-06-13 DIAGNOSIS — Z78.0 POSTMENOPAUSAL: ICD-10-CM

## 2025-06-13 DIAGNOSIS — M85.80 OSTEOPENIA AFTER MENOPAUSE: ICD-10-CM

## 2025-06-13 DIAGNOSIS — Z78.0 OSTEOPENIA AFTER MENOPAUSE: ICD-10-CM

## 2025-06-13 DIAGNOSIS — G89.29 CHRONIC PAIN OF LEFT KNEE: Primary | ICD-10-CM

## 2025-06-13 DIAGNOSIS — M25.562 CHRONIC PAIN OF LEFT KNEE: Primary | ICD-10-CM

## 2025-06-16 RX ORDER — ALENDRONATE SODIUM 70 MG/1
70 TABLET ORAL
Qty: 12 TABLET | Refills: 3 | Status: SHIPPED | OUTPATIENT
Start: 2025-06-16 | End: 2025-09-14

## 2025-06-19 ENCOUNTER — HOSPITAL ENCOUNTER (OUTPATIENT)
Dept: MRI IMAGING | Age: 65
Discharge: HOME | End: 2025-06-19
Payer: MEDICARE

## 2025-06-19 ENCOUNTER — HOSPITAL ENCOUNTER (OUTPATIENT)
Dept: GENERAL RADIOLOGY | Age: 65
Discharge: HOME | End: 2025-06-19
Payer: MEDICARE

## 2025-06-19 DIAGNOSIS — G89.29 CHRONIC PAIN OF LEFT KNEE: ICD-10-CM

## 2025-06-19 DIAGNOSIS — M25.562 ARTHRALGIA OF KNEE, LEFT: ICD-10-CM

## 2025-06-19 DIAGNOSIS — M25.562 CHRONIC PAIN OF LEFT KNEE: ICD-10-CM

## 2025-06-19 PROCEDURE — 73721 MRI JNT OF LWR EXTRE W/O DYE: CPT

## 2025-06-19 PROCEDURE — 73560 X-RAY EXAM OF KNEE 1 OR 2: CPT

## 2025-06-30 ENCOUNTER — OFFICE VISIT (OUTPATIENT)
Age: 65
End: 2025-06-30
Payer: MEDICARE

## 2025-06-30 VITALS — HEIGHT: 62 IN | BODY MASS INDEX: 29.08 KG/M2 | WEIGHT: 158 LBS

## 2025-06-30 DIAGNOSIS — G89.29 CHRONIC PAIN OF LEFT KNEE: Primary | ICD-10-CM

## 2025-06-30 DIAGNOSIS — M25.562 CHRONIC PAIN OF LEFT KNEE: Primary | ICD-10-CM

## 2025-06-30 DIAGNOSIS — M17.12 PRIMARY LOCALIZED OSTEOARTHRITIS OF LEFT KNEE: ICD-10-CM

## 2025-06-30 PROCEDURE — 1159F MED LIST DOCD IN RCRD: CPT | Performed by: PHYSICIAN ASSISTANT

## 2025-06-30 PROCEDURE — G8399 PT W/DXA RESULTS DOCUMENT: HCPCS | Performed by: PHYSICIAN ASSISTANT

## 2025-06-30 PROCEDURE — 99213 OFFICE O/P EST LOW 20 MIN: CPT | Performed by: PHYSICIAN ASSISTANT

## 2025-06-30 PROCEDURE — G8419 CALC BMI OUT NRM PARAM NOF/U: HCPCS | Performed by: PHYSICIAN ASSISTANT

## 2025-06-30 PROCEDURE — 20610 DRAIN/INJ JOINT/BURSA W/O US: CPT | Performed by: PHYSICIAN ASSISTANT

## 2025-06-30 PROCEDURE — 1036F TOBACCO NON-USER: CPT | Performed by: PHYSICIAN ASSISTANT

## 2025-06-30 PROCEDURE — 3017F COLORECTAL CA SCREEN DOC REV: CPT | Performed by: PHYSICIAN ASSISTANT

## 2025-06-30 PROCEDURE — 1090F PRES/ABSN URINE INCON ASSESS: CPT | Performed by: PHYSICIAN ASSISTANT

## 2025-06-30 PROCEDURE — G8427 DOCREV CUR MEDS BY ELIG CLIN: HCPCS | Performed by: PHYSICIAN ASSISTANT

## 2025-06-30 PROCEDURE — 1123F ACP DISCUSS/DSCN MKR DOCD: CPT | Performed by: PHYSICIAN ASSISTANT

## 2025-06-30 RX ORDER — LIDOCAINE HYDROCHLORIDE 10 MG/ML
6 INJECTION, SOLUTION INFILTRATION; PERINEURAL ONCE
Status: COMPLETED | OUTPATIENT
Start: 2025-06-30 | End: 2025-06-30

## 2025-06-30 RX ORDER — BETAMETHASONE SODIUM PHOSPHATE AND BETAMETHASONE ACETATE 3; 3 MG/ML; MG/ML
6 INJECTION, SUSPENSION INTRA-ARTICULAR; INTRALESIONAL; INTRAMUSCULAR; SOFT TISSUE ONCE
Status: COMPLETED | OUTPATIENT
Start: 2025-06-30 | End: 2025-06-30

## 2025-06-30 RX ADMIN — BETAMETHASONE SODIUM PHOSPHATE AND BETAMETHASONE ACETATE 6 MG: 3; 3 INJECTION, SUSPENSION INTRA-ARTICULAR; INTRALESIONAL; INTRAMUSCULAR; SOFT TISSUE at 11:28

## 2025-06-30 RX ADMIN — LIDOCAINE HYDROCHLORIDE 6 ML: 10 INJECTION, SOLUTION INFILTRATION; PERINEURAL at 11:28

## 2025-06-30 NOTE — PROGRESS NOTES
[facility]  [facility]  [address] [facility] [place] [address]  Dept: [phone]  Dept Fax: [phone]  Loc: [phone]     Merline Swan (:  1960) is a 65 y.o. female,Established patient, here for evaluation of the following:    Chief Complaint   Patient presents with    New Patient     Lt Knee            Subjective   Patient is a 65-year-old  female presented to clinic with left knee pain.  She reports she had an injury about 4 months ago.  She been taking pain medication with mild relief.  She rates her pain level at a 2 out of 10 but constant.  She reports her knee is giving away.  She locates the pain on the inside of her knee only.  She has had x-rays and MRI at a [facility] facility.  X-ray and MRI show medial sided osteoarthritis as well as some meniscus tearing.        Allergies   Allergen Reactions    Bactrim [Sulfamethoxazole-Trimethoprim] Shortness Of Breath     Past Surgical History:   Procedure Laterality Date    APPENDECTOMY      CYST REMOVAL      FROM LEGS    FINGER TRIGGER RELEASE Left 2021    LEFT THUMB A1 PULLEY RELEASE performed by Wicho Lee MD at [facility] OR    HAND SURGERY      trigger thumb    HYSTERECTOMY (CERVIX STATUS UNKNOWN)      MAYTE AND BSO (CERVIX REMOVED) Bilateral     age 31     Social History     Tobacco Use    Smoking status: Former     Current packs/day: 0.00     Types: Cigarettes     Quit date: 2021     Years since quitting: 3.5    Smokeless tobacco: Never    Tobacco comments:     USING A PATCH on today    Vaping Use    Vaping status: Never Used   Substance Use Topics    Alcohol use: Not Currently    Drug use: Never          Review of Systems   Constitutional:  Negative for fatigue and fever.   Respiratory:  Negative for shortness of breath.    Cardiovascular:  Negative for chest pain.   Musculoskeletal:  Positive for arthralgias. Negative for joint swelling and

## 2025-07-01 ASSESSMENT — ENCOUNTER SYMPTOMS: SHORTNESS OF BREATH: 0

## 2025-08-11 ENCOUNTER — OFFICE VISIT (OUTPATIENT)
Age: 65
End: 2025-08-11
Payer: MEDICARE

## 2025-08-11 VITALS — WEIGHT: 159 LBS | BODY MASS INDEX: 29.26 KG/M2 | HEIGHT: 62 IN

## 2025-08-11 DIAGNOSIS — M17.12 PRIMARY LOCALIZED OSTEOARTHRITIS OF LEFT KNEE: Primary | ICD-10-CM

## 2025-08-11 PROCEDURE — 99214 OFFICE O/P EST MOD 30 MIN: CPT | Performed by: PHYSICIAN ASSISTANT

## 2025-08-11 PROCEDURE — G8399 PT W/DXA RESULTS DOCUMENT: HCPCS | Performed by: PHYSICIAN ASSISTANT

## 2025-08-11 PROCEDURE — 1036F TOBACCO NON-USER: CPT | Performed by: PHYSICIAN ASSISTANT

## 2025-08-11 PROCEDURE — 1090F PRES/ABSN URINE INCON ASSESS: CPT | Performed by: PHYSICIAN ASSISTANT

## 2025-08-11 PROCEDURE — G8427 DOCREV CUR MEDS BY ELIG CLIN: HCPCS | Performed by: PHYSICIAN ASSISTANT

## 2025-08-11 PROCEDURE — 1123F ACP DISCUSS/DSCN MKR DOCD: CPT | Performed by: PHYSICIAN ASSISTANT

## 2025-08-11 PROCEDURE — G8419 CALC BMI OUT NRM PARAM NOF/U: HCPCS | Performed by: PHYSICIAN ASSISTANT

## 2025-08-11 PROCEDURE — 3017F COLORECTAL CA SCREEN DOC REV: CPT | Performed by: PHYSICIAN ASSISTANT

## 2025-08-11 ASSESSMENT — ENCOUNTER SYMPTOMS: SHORTNESS OF BREATH: 0

## 2025-08-13 DIAGNOSIS — G89.29 CHRONIC PAIN OF LEFT KNEE: Primary | ICD-10-CM

## 2025-08-13 DIAGNOSIS — M25.562 CHRONIC PAIN OF LEFT KNEE: Primary | ICD-10-CM

## 2025-08-13 PROBLEM — M17.12 OSTEOARTHRITIS OF LEFT KNEE: Status: ACTIVE | Noted: 2025-08-11

## 2025-08-15 RX ORDER — CHLORHEXIDINE GLUCONATE 40 MG/ML
SOLUTION TOPICAL DAILY PRN
Qty: 1 EACH | Refills: 0 | Status: SHIPPED | OUTPATIENT
Start: 2025-08-15 | End: 2025-08-18

## 2025-08-15 RX ORDER — MUPIROCIN 2 %
OINTMENT (GRAM) TOPICAL
Qty: 1 EACH | Refills: 0 | Status: SHIPPED | OUTPATIENT
Start: 2025-08-15

## 2025-08-15 RX ORDER — CLINDAMYCIN PHOSPHATE AND BENZOYL PEROXIDE 10; 50 MG/G; MG/G
GEL TOPICAL
Qty: 1 EACH | Refills: 0 | Status: SHIPPED | OUTPATIENT
Start: 2025-08-15

## (undated) DEVICE — MINOR CDS: Brand: MEDLINE INDUSTRIES, INC.

## (undated) DEVICE — GLOVE SURG SZ 85 L12IN FNGR THK79MIL GRN LTX FREE

## (undated) DEVICE — MASK,OXYGEN,MED CONC,ADLT,7' TUB, UC: Brand: PENDING

## (undated) DEVICE — SENSR O2 OXIMAX FNGR A/ 18IN NONSTR

## (undated) DEVICE — THE SINGLE USE ETRAP – POLYP TRAP IS USED FOR SUCTION RETRIEVAL OF ENDOSCOPICALLY REMOVED POLYPS.: Brand: ETRAP

## (undated) DEVICE — GLOVE SURG SZ 85 CRM LTX FREE POLYISOPRENE POLYMER BEAD ANTI

## (undated) DEVICE — CHLORAPREP 26ML ORANGE

## (undated) DEVICE — BANDAGE COMPR W6INXL12FT SMOOTH FOR LIMB EXSANG ESMARCH

## (undated) DEVICE — THE CHANNEL CLEANING BRUSH IS A NYLON FLEXI BRUSH ATTACHED TO A FLEXIBLE PLASTIC SHEATH DESIGNED TO SAFELY REMOVE DEBRIS FROM FLEXIBLE ENDOSCOPES.

## (undated) DEVICE — DRAPE,U/ SHT,SPLIT,PLAS,STERIL: Brand: MEDLINE

## (undated) DEVICE — SOLUTION IV 1000ML LAC RINGERS PH 6.5 INJ USP VIAFLX PLAS

## (undated) DEVICE — GLOVE SURG SZ 8 L12IN FNGR THK79MIL GRN LTX FREE

## (undated) DEVICE — SOLUTION IV IRRIG POUR BRL 0.9% SODIUM CHL 2F7124

## (undated) DEVICE — AMBU AURA-I U SIZE 3, DISPOSABLE LARYNGEAL MASK: Brand: AURA-I

## (undated) DEVICE — YANKAUER,BULB TIP WITH VENT: Brand: ARGYLE

## (undated) DEVICE — BANDAGE GZ W2XL75IN ST RAYON POLY CNFRM STRTCH LTWT

## (undated) DEVICE — TBG SMPL FLTR LINE NASL 02/C02 A/ BX/100

## (undated) DEVICE — GLOVE SURG SZ 85 L12IN FNGR THK94MIL TRNSLUC YEL LTX

## (undated) DEVICE — Device: Brand: DEFENDO AIR/WATER/SUCTION AND BIOPSY VALVE

## (undated) DEVICE — Z DISCONTINUED USE 2272117 DRAPE SURG 3 QTR N INVASIVE 2 LAYR DISP

## (undated) DEVICE — DRAPE,EXTREMITY,89X128,STERILE: Brand: MEDLINE

## (undated) DEVICE — SUTURE ETHLN SZ 4-0 L18IN NONABSORBABLE BLK L19MM PS-2 3/8 1667H